# Patient Record
Sex: MALE | Race: OTHER | Employment: FULL TIME | URBAN - METROPOLITAN AREA
[De-identification: names, ages, dates, MRNs, and addresses within clinical notes are randomized per-mention and may not be internally consistent; named-entity substitution may affect disease eponyms.]

---

## 2017-01-07 ENCOUNTER — TELEPHONE (OUTPATIENT)
Dept: FAMILY MEDICINE CLINIC | Facility: CLINIC | Age: 47
End: 2017-01-07

## 2017-01-07 NOTE — TELEPHONE ENCOUNTER
Pt called in requesting to speak to Dr. Anne Hager or an RN about getting test strips for his diabetes. No further info was given.

## 2017-01-07 NOTE — TELEPHONE ENCOUNTER
With the assistance of  number [id number] patient states he lost all his accessories for his blood sugar monitor including the monitor. Supplies were originally ordered by Dr Dang Padgett at his old office.   Pt advised we do not have a record of which

## 2017-02-21 ENCOUNTER — OFFICE VISIT (OUTPATIENT)
Dept: FAMILY MEDICINE CLINIC | Facility: CLINIC | Age: 47
End: 2017-02-21

## 2017-02-21 VITALS
TEMPERATURE: 98 F | DIASTOLIC BLOOD PRESSURE: 68 MMHG | SYSTOLIC BLOOD PRESSURE: 129 MMHG | WEIGHT: 224 LBS | BODY MASS INDEX: 35 KG/M2 | HEART RATE: 78 BPM

## 2017-02-21 DIAGNOSIS — E11.9 TYPE 2 DIABETES MELLITUS WITHOUT COMPLICATION, WITHOUT LONG-TERM CURRENT USE OF INSULIN (HCC): Primary | ICD-10-CM

## 2017-02-21 PROCEDURE — 99214 OFFICE O/P EST MOD 30 MIN: CPT | Performed by: FAMILY MEDICINE

## 2017-02-21 PROCEDURE — 99212 OFFICE O/P EST SF 10 MIN: CPT | Performed by: FAMILY MEDICINE

## 2017-02-21 NOTE — PROGRESS NOTES
2/21/2017  4:38 PM    Candi Chávez is a 48year old male. Chief complaint(s): Patient presents with: Follow - Up: Patient here to f/u on his diabetes  Diabetes    HPI:     Roscoe Ness primary complaint is regarding DM.      Patient Se Date(s) Administered    >=3 YRS TRI  MULTIDOSE VIAL (21930) FLU CLINIC                          10/11/2016      TDAP                  08/01/2013      Medications (Active prior to today's visit):    Current Outpatient Prescriptions:  [employer] NEXT TEST Appropriate affect and demeanor.        LABORATORY RESULTS:   No results found for: Hiwot Rodrigues     Results for orders placed or performed during the hospital encounter of 12/21/16  -[facility] POCT RAPID STREP   Result Va HgbA1C level checked quarterly, daily foot self-inspection, need for yearly flu shots, and avoid all sodas, juices, candy, chocolates, cakes, ice cream, etc.      FOLLOW-UP: Schedule a follow-up visit in 4 months.        COUNSELING: The patient was counsele

## 2017-02-22 ENCOUNTER — TELEPHONE (OUTPATIENT)
Dept: FAMILY MEDICINE CLINIC | Facility: CLINIC | Age: 47
End: 2017-02-22

## 2017-07-18 ENCOUNTER — OFFICE VISIT (OUTPATIENT)
Dept: FAMILY MEDICINE CLINIC | Facility: CLINIC | Age: 47
End: 2017-07-18

## 2017-07-18 ENCOUNTER — LAB ENCOUNTER (OUTPATIENT)
Dept: LAB | Age: 47
End: 2017-07-18
Attending: FAMILY MEDICINE
Payer: COMMERCIAL

## 2017-07-18 VITALS
WEIGHT: 223 LBS | TEMPERATURE: 99 F | SYSTOLIC BLOOD PRESSURE: 127 MMHG | HEART RATE: 73 BPM | BODY MASS INDEX: 33.03 KG/M2 | DIASTOLIC BLOOD PRESSURE: 72 MMHG | HEIGHT: 69 IN

## 2017-07-18 DIAGNOSIS — E11.9 TYPE 2 DIABETES MELLITUS WITHOUT COMPLICATION, WITHOUT LONG-TERM CURRENT USE OF INSULIN (HCC): Primary | ICD-10-CM

## 2017-07-18 DIAGNOSIS — E11.9 TYPE 2 DIABETES MELLITUS WITHOUT COMPLICATION, WITHOUT LONG-TERM CURRENT USE OF INSULIN (HCC): ICD-10-CM

## 2017-07-18 LAB
ALBUMIN SERPL BCP-MCNC: 4.1 G/DL (ref 3.5–4.8)
ALBUMIN/GLOB SERPL: 1.7 {RATIO} (ref 1–2)
ALP SERPL-CCNC: 75 U/L (ref 32–100)
ALT SERPL-CCNC: 43 U/L (ref 17–63)
ANION GAP SERPL CALC-SCNC: 8 MMOL/L (ref 0–18)
AST SERPL-CCNC: 35 U/L (ref 15–41)
BILIRUB SERPL-MCNC: 0.4 MG/DL (ref 0.3–1.2)
BUN SERPL-MCNC: 11 MG/DL (ref 8–20)
BUN/CREAT SERPL: 15.7 (ref 10–20)
CALCIUM SERPL-MCNC: 8.9 MG/DL (ref 8.5–10.5)
CHLORIDE SERPL-SCNC: 106 MMOL/L (ref 95–110)
CO2 SERPL-SCNC: 23 MMOL/L (ref 22–32)
CREAT SERPL-MCNC: 0.7 MG/DL (ref 0.5–1.5)
CREAT UR-MCNC: 140.6 MG/DL
GLOBULIN PLAS-MCNC: 2.4 G/DL (ref 2.5–3.7)
GLUCOSE SERPL-MCNC: 176 MG/DL (ref 70–99)
MICROALBUMIN UR-MCNC: 0.3 MG/DL (ref 0–1.8)
MICROALBUMIN/CREAT UR: 2.1 MG/G{CREAT} (ref 0–20)
OSMOLALITY UR CALC.SUM OF ELEC: 288 MOSM/KG (ref 275–295)
POTASSIUM SERPL-SCNC: 3.7 MMOL/L (ref 3.3–5.1)
PROT SERPL-MCNC: 6.5 G/DL (ref 5.9–8.4)
SODIUM SERPL-SCNC: 137 MMOL/L (ref 136–144)

## 2017-07-18 PROCEDURE — 82570 ASSAY OF URINE CREATININE: CPT

## 2017-07-18 PROCEDURE — 83036 HEMOGLOBIN GLYCOSYLATED A1C: CPT

## 2017-07-18 PROCEDURE — 99214 OFFICE O/P EST MOD 30 MIN: CPT | Performed by: FAMILY MEDICINE

## 2017-07-18 PROCEDURE — 36415 COLL VENOUS BLD VENIPUNCTURE: CPT

## 2017-07-18 PROCEDURE — 82043 UR ALBUMIN QUANTITATIVE: CPT

## 2017-07-18 PROCEDURE — 99212 OFFICE O/P EST SF 10 MIN: CPT | Performed by: FAMILY MEDICINE

## 2017-07-18 PROCEDURE — 80053 COMPREHEN METABOLIC PANEL: CPT

## 2017-07-18 NOTE — PROGRESS NOTES
7/18/2017  3:26 PM    Iris Damon is a 48year old male. Chief complaint(s): Patient presents with:   Follow - Up: Patient here to f/u on his diabetes and requesting a referral to opthamologist.   Diabetes    HPI:     Chandler Newman History  Administered            Date(s) Administered    >=3 YRS TRI  MULTIDOSE VIAL (33700) FLU CLINIC                          10/11/2016      TDAP                  08/01/2013      Medications (Active prior to today's visit):    Current Outpatient Prescr icterus. Neck: Neck supple. Cardiovascular: Normal rate and regular rhythm. Pulmonary/Chest:   Clear to ascultation bilaterally. Abdominal: Soft. Normal appearance and bowel sounds are normal. There is no tenderness.    Feet: diabetic foot exam per tablet 3      Sig: Take 1 tablet (500 mg total) by mouth 2 (two) times daily. SAXagliptin HCl (ONGLYZA) 5 MG Oral Tab 90 tablet 2      Sig: Take 1 tablet by mouth daily. REFERRALS:       Ophthomology Dr Minal Morales.    RECOMMENDATIONS: instructed i

## 2017-07-19 LAB — HBA1C MFR BLD: 7.3 % (ref 4–6)

## 2017-07-19 NOTE — PROGRESS NOTES
Please call patient, results are within normal limits.   Diabetes well controlled continue present management keep present appointment 4 months from last visit

## 2017-07-20 ENCOUNTER — TELEPHONE (OUTPATIENT)
Dept: FAMILY MEDICINE CLINIC | Facility: CLINIC | Age: 47
End: 2017-07-20

## 2017-07-20 NOTE — TELEPHONE ENCOUNTER
Called patient. No answer. Left message on answering machine of normal results. Informed him his A1C has improved to continue present management and to keep his f/u appointment with Dr. Estela Shell in November. Also informed of normal CMP and microalbumin.

## 2017-07-20 NOTE — TELEPHONE ENCOUNTER
----- Message from Fortunato Serrano MD sent at 7/19/2017  3:51 PM CDT -----  Please call patient, results are within normal limits.   Diabetes well controlled continue present management keep present appointment 4 months from last visit

## 2017-09-22 ENCOUNTER — OFFICE VISIT (OUTPATIENT)
Dept: FAMILY MEDICINE CLINIC | Facility: CLINIC | Age: 47
End: 2017-09-22

## 2017-09-22 ENCOUNTER — NURSE TRIAGE (OUTPATIENT)
Dept: OTHER | Age: 47
End: 2017-09-22

## 2017-09-22 VITALS
HEART RATE: 83 BPM | WEIGHT: 225 LBS | SYSTOLIC BLOOD PRESSURE: 131 MMHG | HEIGHT: 69 IN | BODY MASS INDEX: 33.33 KG/M2 | DIASTOLIC BLOOD PRESSURE: 78 MMHG

## 2017-09-22 DIAGNOSIS — J30.2 ACUTE SEASONAL ALLERGIC RHINITIS, UNSPECIFIED TRIGGER: ICD-10-CM

## 2017-09-22 DIAGNOSIS — J45.21 MILD INTERMITTENT ASTHMA WITH ACUTE EXACERBATION: Primary | ICD-10-CM

## 2017-09-22 PROCEDURE — 99214 OFFICE O/P EST MOD 30 MIN: CPT | Performed by: NURSE PRACTITIONER

## 2017-09-22 RX ORDER — MONTELUKAST SODIUM 10 MG/1
10 TABLET ORAL DAILY
Qty: 90 TABLET | Refills: 3 | Status: SHIPPED | OUTPATIENT
Start: 2017-09-22 | End: 2018-06-25 | Stop reason: ALTCHOICE

## 2017-09-22 RX ORDER — FLUTICASONE PROPIONATE 50 MCG
2 SPRAY, SUSPENSION (ML) NASAL DAILY
Qty: 3 BOTTLE | Refills: 3 | Status: SHIPPED | OUTPATIENT
Start: 2017-09-22 | End: 2017-10-27

## 2017-09-22 RX ORDER — LEVOCETIRIZINE DIHYDROCHLORIDE 5 MG/1
5 TABLET, FILM COATED ORAL EVERY EVENING
Qty: 90 TABLET | Refills: 1 | Status: SHIPPED | OUTPATIENT
Start: 2017-09-22 | End: 2017-09-25

## 2017-09-22 NOTE — PROGRESS NOTES
Shortness Of Breath   This is a recurrent problem. The current episode started in the past 7 days. The problem has been gradually improving.  Pertinent negatives include no abdominal pain, chest pain, coryza, ear pain, fever, headaches, leg pain, leg swelli Social History Main Topics   Smoking status: Former Smoker     Types: Cigarettes    Quit date: 1/1/2002    Smokeless tobacco: Former User    Alcohol use No    Comment: non-drinker    Drug use: Unknown     Other Topics Concern   None on file     Social Abdominal: Soft. Bowel sounds are normal. He exhibits no distension and no mass. There is no tenderness. Musculoskeletal: Normal range of motion. Lymphadenopathy:     He has no cervical adenopathy.    Neurological: He is oriented to person, place, and

## 2017-09-22 NOTE — TELEPHONE ENCOUNTER
Action Requested: Summary for Provider     []  Critical Lab, Recommendations Needed  [] Need Additional Advice  [x]   FYI    []   Need Orders  [] Need Medications Sent to Pharmacy  []  Other     SUMMARY: Pt contacts clinic c/o cough x 5 days.   Described ep

## 2017-09-22 NOTE — PATIENT INSTRUCTIONS
Los desencadenantes del asma  Los desencadenantes son sustancias o condiciones que desencadenan chase síntomas de asma. Algunos desencadenantes pueden evitarse completamente, mientras que otros pueden prevenirse y es posible adaptarse a ellos.  Suzanne Núñez Las personas con asma suelen tener alergias. Si usted tiene alergias o Bhargav Farrellne, hable con blood proveedor de atención médica acerca de las pruebas y las opciones de tratamiento.  Las pruebas de alérgenos pueden determinar exactamente cuáles son La rinitis alérgica es roosevelt reacción alérgica que afecta la nariz y, con frecuencia, los ojos. Se la suele conocer thom alergias nasales. Es frecuente que las alergias nasales se deban a elementos que están presentes en el medioambiente y se respiran.  Según · Lave la ropa de cama todas las semanas con [place] y detergente o séquela en un ambiente caliente. · Cubra el colchón, el somier y las almohadas con fundas antialérgicas.   · Si es posible, duerma en un cuarto que no tenga tapete, nicholas ni muebl

## 2017-09-25 ENCOUNTER — TELEPHONE (OUTPATIENT)
Dept: OTHER | Age: 47
End: 2017-09-25

## 2017-09-25 ENCOUNTER — OFFICE VISIT (OUTPATIENT)
Dept: FAMILY MEDICINE CLINIC | Facility: CLINIC | Age: 47
End: 2017-09-25

## 2017-09-25 VITALS
WEIGHT: 223 LBS | DIASTOLIC BLOOD PRESSURE: 67 MMHG | BODY MASS INDEX: 33 KG/M2 | SYSTOLIC BLOOD PRESSURE: 122 MMHG | HEART RATE: 71 BPM | TEMPERATURE: 98 F

## 2017-09-25 DIAGNOSIS — E11.9 TYPE 2 DIABETES MELLITUS WITHOUT COMPLICATION, WITHOUT LONG-TERM CURRENT USE OF INSULIN (HCC): ICD-10-CM

## 2017-09-25 DIAGNOSIS — R42 DIZZINESS: Primary | ICD-10-CM

## 2017-09-25 LAB
GLUCOSE BLOOD: 264
TEST STRIP LOT #: NORMAL NUMERIC

## 2017-09-25 PROCEDURE — 82962 GLUCOSE BLOOD TEST: CPT | Performed by: FAMILY MEDICINE

## 2017-09-25 PROCEDURE — 99214 OFFICE O/P EST MOD 30 MIN: CPT | Performed by: FAMILY MEDICINE

## 2017-09-25 PROCEDURE — 36416 COLLJ CAPILLARY BLOOD SPEC: CPT | Performed by: FAMILY MEDICINE

## 2017-09-25 PROCEDURE — 99212 OFFICE O/P EST SF 10 MIN: CPT | Performed by: FAMILY MEDICINE

## 2017-09-25 RX ORDER — MECLIZINE HCL 12.5 MG/1
12.5 TABLET ORAL 3 TIMES DAILY PRN
Qty: 40 TABLET | Refills: 0 | Status: SHIPPED | OUTPATIENT
Start: 2017-09-25 | End: 2018-06-25 | Stop reason: ALTCHOICE

## 2017-09-25 NOTE — PROGRESS NOTES
9/25/2017  4:09 PM    Shiloh Pereira is a 48year old male. Chief complaint(s): Patient presents with:  Dizziness: X 4 days  Fatigue  Blurry Vision    HPI:     Shiloh Pereira primary complaint is regarding dizziness.      Shiloh Pereira Nasal Suspension 2 sprays by Each Nare route daily. Disp: 3 Bottle Rfl: 3   Montelukast Sodium (SINGULAIR) 10 MG Oral Tab Take 1 tablet (10 mg total) by mouth daily.  Disp: 90 tablet Rfl: 3   MetFORMIN HCl 500 MG Oral Tab Take 1 tablet (500 mg total) by lorenzo appearance and bowel sounds are normal. There is no tenderness. Skin: No rash noted. Psychiatric:   Appropriate affect and demeanor.        LABORATORY RESULTS:   No results found for: Hiwot Rodrigues     Results for o condition. Also, inform the doctor with any new symptoms or medications' side effects. Tight diabetes control. FOLLOW-UP: Schedule a follow-up visit in [address] [place] [address] /PRN.          Orders This Visit:    Orders Placed This Encounter      POC Finger stick glucose [

## 2017-09-25 NOTE — TELEPHONE ENCOUNTER
Using language line  Harish Vzáquez [id number]: Was seen on 9/22/17 by Marlen Alas. Patient stated he took both the Singulair and Levocetirizine this morning,    Patient stated is very dizzy and had to leave work.   He also has blurry visi

## 2017-10-04 ENCOUNTER — OFFICE VISIT (OUTPATIENT)
Dept: FAMILY MEDICINE CLINIC | Facility: CLINIC | Age: 47
End: 2017-10-04

## 2017-10-04 ENCOUNTER — NURSE TRIAGE (OUTPATIENT)
Dept: OTHER | Age: 47
End: 2017-10-04

## 2017-10-04 VITALS
TEMPERATURE: 98 F | WEIGHT: 223 LBS | BODY MASS INDEX: 33 KG/M2 | HEART RATE: 80 BPM | SYSTOLIC BLOOD PRESSURE: 132 MMHG | DIASTOLIC BLOOD PRESSURE: 76 MMHG

## 2017-10-04 DIAGNOSIS — N52.9 ERECTILE DYSFUNCTION, UNSPECIFIED ERECTILE DYSFUNCTION TYPE: ICD-10-CM

## 2017-10-04 DIAGNOSIS — N50.819 TESTICULAR PAIN: Primary | ICD-10-CM

## 2017-10-04 PROCEDURE — 99214 OFFICE O/P EST MOD 30 MIN: CPT | Performed by: FAMILY MEDICINE

## 2017-10-04 PROCEDURE — 81002 URINALYSIS NONAUTO W/O SCOPE: CPT | Performed by: FAMILY MEDICINE

## 2017-10-04 PROCEDURE — 99212 OFFICE O/P EST SF 10 MIN: CPT | Performed by: FAMILY MEDICINE

## 2017-10-04 RX ORDER — SILDENAFIL 100 MG/1
100 TABLET, FILM COATED ORAL AS NEEDED
Qty: 3 TABLET | Refills: 1 | Status: SHIPPED | OUTPATIENT
Start: 2017-10-04 | End: 2020-01-21

## 2017-10-04 NOTE — TELEPHONE ENCOUNTER
Action Requested: Summary for Provider     []  Critical Lab, Recommendations Needed  [] Need Additional Advice  [x]   FYI    []   Need Orders  [] Need Medications Sent to Pharmacy  [x]  Other     SUMMARY:OV appt made for today at 11:00 am.      Please note

## 2017-10-04 NOTE — PROGRESS NOTES
10/4/2017  11:55 AM    Maru Perla is a 48year old male.     Chief complaint(s): Patient presents with:  Pain: pt c/o pain in testicles X 2 days  Urinary: pt states had blood un urine this morning    HPI:     Maru Perla primary complaint Nasal Suspension 2 sprays by Each Nare route daily. Disp: 3 Bottle Rfl: 3   Montelukast Sodium (SINGULAIR) 10 MG Oral Tab Take 1 tablet (10 mg total) by mouth daily.  Disp: 90 tablet Rfl: 3   MetFORMIN HCl 500 MG Oral Tab Take 1 tablet (500 mg total) by lorenzo bilaterally. Abdominal: Soft. Normal appearance and bowel sounds are normal. There is no tenderness. Genitourinary: Testes normal and penis normal. Right testis shows no mass and no tenderness. Left testis shows no mass and no tenderness.  Uncircumcised or medications' side effects. FOLLOW-UP: Schedule a follow-up visit in 1-2 weeks.            Orders This Visit:    Orders Placed This Encounter      POC Urinalysis, Manual Dip without microscopy [[id number]]      Chlamydia/Gc Amplification [E]    Meds This

## 2017-10-05 ENCOUNTER — TELEPHONE (OUTPATIENT)
Dept: FAMILY MEDICINE CLINIC | Facility: CLINIC | Age: 47
End: 2017-10-05

## 2017-10-05 NOTE — TELEPHONE ENCOUNTER
----- Message from Fabiola Do MD sent at 10/5/2017  4:09 PM CDT -----  Please call patient, results are within normal limits.

## 2017-10-05 NOTE — TELEPHONE ENCOUNTER
Attempted to reach patient no answer, unable to leave message due to not having VM set up. If patient calls back please transfer to ext [phone].

## 2017-10-11 ENCOUNTER — OFFICE VISIT (OUTPATIENT)
Dept: FAMILY MEDICINE CLINIC | Facility: CLINIC | Age: 47
End: 2017-10-11

## 2017-10-11 VITALS
TEMPERATURE: 98 F | WEIGHT: 221 LBS | HEIGHT: 69 IN | DIASTOLIC BLOOD PRESSURE: 84 MMHG | SYSTOLIC BLOOD PRESSURE: 133 MMHG | BODY MASS INDEX: 32.73 KG/M2 | HEART RATE: 71 BPM

## 2017-10-11 DIAGNOSIS — R42 DIZZINESS: Primary | ICD-10-CM

## 2017-10-11 DIAGNOSIS — E11.9 TYPE 2 DIABETES MELLITUS WITHOUT COMPLICATION, WITHOUT LONG-TERM CURRENT USE OF INSULIN (HCC): ICD-10-CM

## 2017-10-11 PROCEDURE — 36416 COLLJ CAPILLARY BLOOD SPEC: CPT | Performed by: FAMILY MEDICINE

## 2017-10-11 PROCEDURE — 99212 OFFICE O/P EST SF 10 MIN: CPT | Performed by: FAMILY MEDICINE

## 2017-10-11 PROCEDURE — 82962 GLUCOSE BLOOD TEST: CPT | Performed by: FAMILY MEDICINE

## 2017-10-11 PROCEDURE — 99214 OFFICE O/P EST MOD 30 MIN: CPT | Performed by: FAMILY MEDICINE

## 2017-10-11 NOTE — PROGRESS NOTES
10/11/2017  1:07 PM    Tone Martinez is a 48year old male. Chief complaint(s): Patient presents with: Follow - Up: Patient here to f/u on his dizziness    HPI:     Tone Martinez primary complaint is regarding dizziness.      Melisa Buckley Problem Relation Age of Onset   • Diabetes Father      type 2   • Hypertension Mother       Social History: Smoking status: Former Smoker                                                              Packs/day: 0.00      Years: 0.00         Types: Cigaret shortness of breath and wheezing. Cardiovascular: Negative for chest pain and palpitations. Gastrointestinal: Negative for nausea, vomiting, abdominal pain, diarrhea and constipation. Endocrine: Negative for polydipsia and polyuria.    Tee Nicholas -CHLAMYDIA/GONOCOCCUS, ANN-MARIE   Result Value Ref Range   Chlamydia Trachomatis Amplified RNA Negative Negative   Neisseria Gonorrhoeae Amplified RNA Negative Negative   Chlam/GC Source Urine      EKG / Spirometry : -     Radiology: No results found.  -    AS Schedule a follow-up visit in  [address] [place] [address].            Orders This Visit:    Orders Placed This Encounter      POC Finger stick glucose [[id number]]    Meds This Visit:    No prescriptions requested or ordered in this encounter    Imaging & Referrals:  [facility]

## 2017-10-14 ENCOUNTER — HOSPITAL ENCOUNTER (OUTPATIENT)
Dept: ULTRASOUND IMAGING | Age: 47
Discharge: HOME | End: 2017-10-14
Attending: FAMILY MEDICINE
Payer: COMMERCIAL

## 2017-10-14 DIAGNOSIS — N50.819 TESTICULAR PAIN: ICD-10-CM

## 2017-10-14 PROCEDURE — 93975 VASCULAR STUDY: CPT | Performed by: FAMILY MEDICINE

## 2017-10-14 PROCEDURE — 76870 US EXAM SCROTUM: CPT | Performed by: FAMILY MEDICINE

## 2017-10-18 ENCOUNTER — OFFICE VISIT (OUTPATIENT)
Dept: FAMILY MEDICINE CLINIC | Facility: CLINIC | Age: 47
End: 2017-10-18

## 2017-10-18 VITALS
BODY MASS INDEX: 32.46 KG/M2 | HEART RATE: 62 BPM | TEMPERATURE: 98 F | DIASTOLIC BLOOD PRESSURE: 72 MMHG | HEIGHT: 69 IN | SYSTOLIC BLOOD PRESSURE: 120 MMHG | WEIGHT: 219.19 LBS

## 2017-10-18 DIAGNOSIS — R42 DIZZINESS: ICD-10-CM

## 2017-10-18 DIAGNOSIS — R51.9 ACUTE NONINTRACTABLE HEADACHE, UNSPECIFIED HEADACHE TYPE: Primary | ICD-10-CM

## 2017-10-18 DIAGNOSIS — R10.84 GENERALIZED ABDOMINAL PAIN: ICD-10-CM

## 2017-10-18 DIAGNOSIS — J01.11 ACUTE RECURRENT FRONTAL SINUSITIS: ICD-10-CM

## 2017-10-18 PROCEDURE — 99212 OFFICE O/P EST SF 10 MIN: CPT | Performed by: FAMILY MEDICINE

## 2017-10-18 PROCEDURE — 99214 OFFICE O/P EST MOD 30 MIN: CPT | Performed by: FAMILY MEDICINE

## 2017-10-18 RX ORDER — AMOXICILLIN 500 MG/1
500 CAPSULE ORAL 2 TIMES DAILY
Qty: 20 CAPSULE | Refills: 0 | Status: SHIPPED | OUTPATIENT
Start: 2017-10-18 | End: 2017-10-27

## 2017-10-18 RX ORDER — FLUTICASONE PROPIONATE 50 MCG
2 SPRAY, SUSPENSION (ML) NASAL DAILY
Qty: 1 INHALER | Refills: 3 | Status: SHIPPED | OUTPATIENT
Start: 2017-10-18 | End: 2018-06-25 | Stop reason: ALTCHOICE

## 2017-10-18 NOTE — PROGRESS NOTES
10/18/2017  10:16 AM    Mary Sher is a 48year old male. Chief complaint(s): Patient presents with:  Abdominal Pain: RLQ abdominal pain   Dizziness: patient states that he is still having dizzy spells that come and go.      HPI:     Francine Booker Administered    >=3 YRS TRI  MULTIDOSE VIAL (77560) FLU CLINIC                          10/11/2016      TDAP                  08/01/2013      Medications (Active prior to today's visit):    Current Outpatient Prescriptions:  amoxicillin 500 MG Oral Cap Kwame back pain and neck pain. Skin: Negative for rash. Neurological: Positive for dizziness and headaches. Negative for seizures. Psychiatric/Behavioral: Negative for depressed mood.        PHYSICAL EXAM:   Physical Exam    Constitutional: He appears well- capsule 0      Sig: Take 1 capsule (500 mg total) by mouth 2 (two) times daily. Fluticasone Propionate 50 MCG/ACT Nasal Suspension 1 Inhaler 3      Si sprays by Each Nare route daily.        REFERRALS: 1MRI BRAIN (W+WO) (CPT=70553),     MRI BRAIN (

## 2017-10-19 ENCOUNTER — TELEPHONE (OUTPATIENT)
Dept: FAMILY MEDICINE CLINIC | Facility: CLINIC | Age: 47
End: 2017-10-19

## 2017-10-20 NOTE — TELEPHONE ENCOUNTER
Note was approved by Dr. Susann Carrel. Phone call made no answer, note will be placed at [facility]  for p/u.

## 2017-10-27 ENCOUNTER — APPOINTMENT (OUTPATIENT)
Dept: LAB | Age: 47
End: 2017-10-27
Attending: FAMILY MEDICINE
Payer: COMMERCIAL

## 2017-10-27 ENCOUNTER — LAB ENCOUNTER (OUTPATIENT)
Dept: LAB | Age: 47
End: 2017-10-27
Attending: FAMILY MEDICINE
Payer: COMMERCIAL

## 2017-10-27 ENCOUNTER — OFFICE VISIT (OUTPATIENT)
Dept: FAMILY MEDICINE CLINIC | Facility: CLINIC | Age: 47
End: 2017-10-27

## 2017-10-27 ENCOUNTER — NURSE TRIAGE (OUTPATIENT)
Dept: OTHER | Age: 47
End: 2017-10-27

## 2017-10-27 VITALS
HEART RATE: 64 BPM | BODY MASS INDEX: 32 KG/M2 | SYSTOLIC BLOOD PRESSURE: 127 MMHG | WEIGHT: 218 LBS | TEMPERATURE: 98 F | DIASTOLIC BLOOD PRESSURE: 72 MMHG

## 2017-10-27 DIAGNOSIS — Z00.00 ROUTINE GENERAL MEDICAL EXAMINATION AT A HEALTH CARE FACILITY: Primary | ICD-10-CM

## 2017-10-27 DIAGNOSIS — R42 DIZZINESS: ICD-10-CM

## 2017-10-27 DIAGNOSIS — R09.81 NASAL CONGESTION: ICD-10-CM

## 2017-10-27 DIAGNOSIS — R42 DIZZINESS: Primary | ICD-10-CM

## 2017-10-27 PROCEDURE — 99212 OFFICE O/P EST SF 10 MIN: CPT | Performed by: FAMILY MEDICINE

## 2017-10-27 PROCEDURE — 99214 OFFICE O/P EST MOD 30 MIN: CPT | Performed by: FAMILY MEDICINE

## 2017-10-27 PROCEDURE — 36415 COLL VENOUS BLD VENIPUNCTURE: CPT

## 2017-10-27 PROCEDURE — 93005 ELECTROCARDIOGRAM TRACING: CPT

## 2017-10-27 PROCEDURE — 93010 ELECTROCARDIOGRAM REPORT: CPT | Performed by: FAMILY MEDICINE

## 2017-10-27 PROCEDURE — 84443 ASSAY THYROID STIM HORMONE: CPT

## 2017-10-27 RX ORDER — PREDNISONE 20 MG/1
20 TABLET ORAL 2 TIMES DAILY
Qty: 6 TABLET | Refills: 0 | Status: SHIPPED | OUTPATIENT
Start: 2017-10-27 | End: 2017-10-30

## 2017-10-27 RX ORDER — AZITHROMYCIN 250 MG/1
TABLET, FILM COATED ORAL
Qty: 6 TABLET | Refills: 0 | Status: SHIPPED | OUTPATIENT
Start: 2017-10-27 | End: 2018-06-25 | Stop reason: ALTCHOICE

## 2017-10-27 NOTE — TELEPHONE ENCOUNTER
Action Requested: Summary for Provider     []  Critical Lab, Recommendations Needed  [] Need Additional Advice  []   FYI    []   Need Orders  [] Need Medications Sent to Pharmacy  []  Other     SUMMARY: Appointment made with Dr Patric Moulton  Today 10/27/17 at

## 2017-10-27 NOTE — PROGRESS NOTES
Patient has had dizziness for 3 or 4 weeks. It is all day long but sometimes gets worse than other times. Sometimes he has had to miss work because of the dizziness.   He received meclizine which does not help him is received amoxicillin which has not hel

## 2017-10-30 ENCOUNTER — HOSPITAL ENCOUNTER (EMERGENCY)
Facility: HOSPITAL | Age: 47
Discharge: HOME | End: 2017-10-30
Attending: EMERGENCY MEDICINE
Payer: COMMERCIAL

## 2017-10-30 ENCOUNTER — APPOINTMENT (OUTPATIENT)
Dept: GENERAL RADIOLOGY | Facility: HOSPITAL | Age: 47
End: 2017-10-30
Attending: EMERGENCY MEDICINE
Payer: COMMERCIAL

## 2017-10-30 VITALS
BODY MASS INDEX: 35.36 KG/M2 | WEIGHT: 220 LBS | SYSTOLIC BLOOD PRESSURE: 131 MMHG | OXYGEN SATURATION: 95 % | RESPIRATION RATE: 21 BRPM | HEIGHT: 66 IN | TEMPERATURE: 98 F | DIASTOLIC BLOOD PRESSURE: 77 MMHG | HEART RATE: 75 BPM

## 2017-10-30 DIAGNOSIS — J40 BRONCHITIS: Primary | ICD-10-CM

## 2017-10-30 PROCEDURE — 71010 XR CHEST AP PORTABLE  (CPT=71010): CPT | Performed by: EMERGENCY MEDICINE

## 2017-10-30 PROCEDURE — 93005 ELECTROCARDIOGRAM TRACING: CPT

## 2017-10-30 PROCEDURE — 84484 ASSAY OF TROPONIN QUANT: CPT | Performed by: EMERGENCY MEDICINE

## 2017-10-30 PROCEDURE — 85379 FIBRIN DEGRADATION QUANT: CPT | Performed by: EMERGENCY MEDICINE

## 2017-10-30 PROCEDURE — 93010 ELECTROCARDIOGRAM REPORT: CPT | Performed by: EMERGENCY MEDICINE

## 2017-10-30 PROCEDURE — 85025 COMPLETE CBC W/AUTO DIFF WBC: CPT | Performed by: EMERGENCY MEDICINE

## 2017-10-30 PROCEDURE — 36415 COLL VENOUS BLD VENIPUNCTURE: CPT

## 2017-10-30 PROCEDURE — 99285 EMERGENCY DEPT VISIT HI MDM: CPT

## 2017-10-30 PROCEDURE — 80048 BASIC METABOLIC PNL TOTAL CA: CPT | Performed by: EMERGENCY MEDICINE

## 2017-10-30 RX ORDER — AZITHROMYCIN 250 MG/1
TABLET, FILM COATED ORAL
Qty: 1 PACKAGE | Refills: 0 | Status: SHIPPED | OUTPATIENT
Start: 2017-10-30 | End: 2017-11-04

## 2017-10-30 NOTE — ED NOTES
Patient reports right sided chest pain some upper abdominal pain and cough since yesterday. Denies fever. IV established to left hand #20 labs sent. Patient SR on monitor, 98% on room air.

## 2017-10-30 NOTE — ED PROVIDER NOTES
Patient Seen in: [facility] AND [facility] Emergency Department    History   Patient presents with:  Chest Pain Angina (cardiovascular)    Stated Complaint: numbness, chest pain    HPI    39-year-old male with history of diabetes and hyperlipidemia presents with Wt 99.8 kg   SpO2 95%   BMI 35.51 kg/m²         Physical Exam    General Appearance: alert, no distress  Eyes: pupils equal and round no pallor or injection  ENT, Mouth: mucous membranes moist  Respiratory: there are no retractions, lungs are clear to [employer] intervals and axes as noted on EKG Report.   Rate: 76  Rhythm: Sinus Rhythm  Axis: Normal  Reading: Normal EKG           ============================================================  ED Course  ------------------------------------------------------------  M

## 2017-11-06 ENCOUNTER — TELEPHONE (OUTPATIENT)
Dept: NEUROLOGY | Facility: CLINIC | Age: 47
End: 2017-11-06

## 2018-06-25 ENCOUNTER — OFFICE VISIT (OUTPATIENT)
Dept: FAMILY MEDICINE CLINIC | Facility: CLINIC | Age: 48
End: 2018-06-25

## 2018-06-25 VITALS
TEMPERATURE: 98 F | HEART RATE: 71 BPM | BODY MASS INDEX: 36 KG/M2 | DIASTOLIC BLOOD PRESSURE: 76 MMHG | SYSTOLIC BLOOD PRESSURE: 130 MMHG | WEIGHT: 225 LBS

## 2018-06-25 DIAGNOSIS — E11.9 TYPE 2 DIABETES MELLITUS WITHOUT COMPLICATION, WITHOUT LONG-TERM CURRENT USE OF INSULIN (HCC): Primary | ICD-10-CM

## 2018-06-25 PROCEDURE — 99212 OFFICE O/P EST SF 10 MIN: CPT | Performed by: FAMILY MEDICINE

## 2018-06-25 PROCEDURE — 99214 OFFICE O/P EST MOD 30 MIN: CPT | Performed by: FAMILY MEDICINE

## 2018-06-25 NOTE — PROGRESS NOTES
6/25/2018  4:34 PM    Nic Arellano is a 50year old male. Chief complaint(s): Patient presents with: Follow - Up: Patient here for diabetic check, however he did state that he has not taken his medication as prescribed.    Diabetes    HPI:     S Immunizations:     Immunization History  Administered            Date(s) Administered    >=3 YRS TRI  MULTIDOSE VIAL (00318) FLU CLINIC                          10/11/2016      TDAP                  08/01/2013      Medications (Active prior to today's HENT:   Head: Normocephalic. Right Ear: External ear normal.   Left Ear: External ear normal.   Nose: Nose normal. No rhinorrhea.    Mouth/Throat: Oropharynx is clear and moist and mucous membranes are normal.   Eyes: Conjunctivae are normal. No scleral 0.  Signed Prescriptions Disp Refills    SAXagliptin HCl (ONGLYZA) 5 MG Oral Tab 90 tablet 2      Sig: Take 1 tablet by mouth daily. MetFORMIN HCl 500 MG Oral Tab 180 tablet 3      Sig: Take 1 tablet (500 mg total) by mouth 2 (two) times daily.       E Rosalie Carroll MD

## 2018-07-30 ENCOUNTER — NURSE TRIAGE (OUTPATIENT)
Dept: FAMILY MEDICINE CLINIC | Facility: CLINIC | Age: 48
End: 2018-07-30

## 2018-07-30 ENCOUNTER — OFFICE VISIT (OUTPATIENT)
Dept: FAMILY MEDICINE CLINIC | Facility: CLINIC | Age: 48
End: 2018-07-30
Payer: COMMERCIAL

## 2018-07-30 VITALS
BODY MASS INDEX: 34.53 KG/M2 | WEIGHT: 220 LBS | DIASTOLIC BLOOD PRESSURE: 78 MMHG | HEART RATE: 71 BPM | SYSTOLIC BLOOD PRESSURE: 119 MMHG | HEIGHT: 67 IN

## 2018-07-30 DIAGNOSIS — L73.9 FOLLICULITIS: Primary | ICD-10-CM

## 2018-07-30 PROCEDURE — 99214 OFFICE O/P EST MOD 30 MIN: CPT | Performed by: FAMILY MEDICINE

## 2018-07-30 PROCEDURE — 99212 OFFICE O/P EST SF 10 MIN: CPT | Performed by: FAMILY MEDICINE

## 2018-07-30 RX ORDER — DIAPER,BRIEF,INFANT-TODD,DISP
EACH MISCELLANEOUS
Qty: 30 G | Refills: 1 | Status: SHIPPED | OUTPATIENT
Start: 2018-07-30 | End: 2020-01-21

## 2018-07-30 RX ORDER — DOXYCYCLINE 100 MG/1
100 TABLET ORAL 2 TIMES DAILY
Qty: 20 TABLET | Refills: 0 | Status: SHIPPED | OUTPATIENT
Start: 2018-07-30 | End: 2018-11-12

## 2018-07-30 NOTE — TELEPHONE ENCOUNTER
Action Requested: Summary for Provider     []  Critical Lab, Recommendations Needed  [x] Need Additional Advice  []   FYI    []   Need Orders  [] Need Medications Sent to Pharmacy  [x]  Other     SUMMARY: Pt asking if can be added with RM today; otherwis

## 2018-07-30 NOTE — TELEPHONE ENCOUNTER
Received response from  that pt can be added today. Pt informed and states will arrive in 5 minutes; added accordingly.

## 2018-07-30 NOTE — PROGRESS NOTES
7/30/2018  6:05 PM    Kady Hollingsworth is a 50year old male.     Chief complaint(s): Patient presents with:  Rash: rt leg ,very itchy x one week ,possible insect bite ,patient states it looked like a worm     HPI:     Kady Hollingsworth primary compl mouth daily. Disp: 90 tablet Rfl: 2   MetFORMIN HCl 500 MG Oral Tab Take 1 tablet (500 mg total) by mouth 2 (two) times daily.  Disp: 180 tablet Rfl: 3   Sildenafil Citrate (VIAGRA) 100 MG Oral Tab Take 1 tablet (100 mg total) by mouth as needed for [employer] (primary encounter diagnosis)  Diabetes    MEDICATIONS:     Signed Prescriptions Disp Refills    hydrocortisone 1 % External Cream 30 g 1      Sig: Apply to affected area(s) BID      Doxycycline Monohydrate 100 MG Oral Tab 20 tablet 0      Sig: Take 100 mg

## 2018-09-18 ENCOUNTER — OFFICE VISIT (OUTPATIENT)
Dept: FAMILY MEDICINE CLINIC | Facility: CLINIC | Age: 48
End: 2018-09-18
Payer: COMMERCIAL

## 2018-09-18 ENCOUNTER — APPOINTMENT (OUTPATIENT)
Dept: LAB | Age: 48
End: 2018-09-18
Attending: FAMILY MEDICINE
Payer: COMMERCIAL

## 2018-09-18 VITALS
DIASTOLIC BLOOD PRESSURE: 81 MMHG | WEIGHT: 225 LBS | BODY MASS INDEX: 35 KG/M2 | SYSTOLIC BLOOD PRESSURE: 133 MMHG | HEART RATE: 76 BPM

## 2018-09-18 DIAGNOSIS — IMO0001 UNCONTROLLED TYPE 2 DIABETES MELLITUS WITHOUT COMPLICATION, WITHOUT LONG-TERM CURRENT USE OF INSULIN: Primary | ICD-10-CM

## 2018-09-18 DIAGNOSIS — IMO0001 UNCONTROLLED TYPE 2 DIABETES MELLITUS WITHOUT COMPLICATION, WITHOUT LONG-TERM CURRENT USE OF INSULIN: ICD-10-CM

## 2018-09-18 LAB
ALBUMIN SERPL BCP-MCNC: 4 G/DL (ref 3.5–4.8)
ALBUMIN/GLOB SERPL: 1.5 {RATIO} (ref 1–2)
ALP SERPL-CCNC: 91 U/L (ref 32–100)
ALT SERPL-CCNC: 85 U/L (ref 17–63)
ANION GAP SERPL CALC-SCNC: 7 MMOL/L (ref 0–18)
AST SERPL-CCNC: 63 U/L (ref 15–41)
BILIRUB SERPL-MCNC: 0.6 MG/DL (ref 0.3–1.2)
BUN SERPL-MCNC: 8 MG/DL (ref 8–20)
BUN/CREAT SERPL: 10.5 (ref 10–20)
CALCIUM SERPL-MCNC: 9.1 MG/DL (ref 8.5–10.5)
CHLORIDE SERPL-SCNC: 103 MMOL/L (ref 95–110)
CO2 SERPL-SCNC: 23 MMOL/L (ref 22–32)
CREAT SERPL-MCNC: 0.76 MG/DL (ref 0.5–1.5)
CREAT UR-MCNC: 48.1 MG/DL
GLOBULIN PLAS-MCNC: 2.6 G/DL (ref 2.5–3.7)
GLUCOSE SERPL-MCNC: 338 MG/DL (ref 70–99)
HBA1C MFR BLD: 11.4 % (ref 4–6)
MICROALBUMIN UR-MCNC: 0 MG/DL (ref 0–1.8)
MICROALBUMIN/CREAT UR: 0 MG/G{CREAT} (ref 0–20)
OSMOLALITY UR CALC.SUM OF ELEC: 288 MOSM/KG (ref 275–295)
PATIENT FASTING: NO
POTASSIUM SERPL-SCNC: 3.8 MMOL/L (ref 3.3–5.1)
PROT SERPL-MCNC: 6.6 G/DL (ref 5.9–8.4)
SODIUM SERPL-SCNC: 133 MMOL/L (ref 136–144)

## 2018-09-18 PROCEDURE — 99214 OFFICE O/P EST MOD 30 MIN: CPT | Performed by: FAMILY MEDICINE

## 2018-09-18 PROCEDURE — 80053 COMPREHEN METABOLIC PANEL: CPT

## 2018-09-18 PROCEDURE — 36415 COLL VENOUS BLD VENIPUNCTURE: CPT

## 2018-09-18 PROCEDURE — 83036 HEMOGLOBIN GLYCOSYLATED A1C: CPT

## 2018-09-18 PROCEDURE — 82043 UR ALBUMIN QUANTITATIVE: CPT

## 2018-09-18 PROCEDURE — 99212 OFFICE O/P EST SF 10 MIN: CPT | Performed by: FAMILY MEDICINE

## 2018-09-18 PROCEDURE — 82570 ASSAY OF URINE CREATININE: CPT

## 2018-09-26 ENCOUNTER — APPOINTMENT (OUTPATIENT)
Dept: ENDOCRINOLOGY | Facility: HOSPITAL | Age: 48
End: 2018-09-26
Attending: FAMILY MEDICINE
Payer: COMMERCIAL

## 2018-10-10 ENCOUNTER — APPOINTMENT (OUTPATIENT)
Dept: ENDOCRINOLOGY | Facility: HOSPITAL | Age: 48
End: 2018-10-10
Attending: FAMILY MEDICINE
Payer: COMMERCIAL

## 2018-10-12 ENCOUNTER — TELEPHONE (OUTPATIENT)
Dept: ENDOCRINOLOGY | Facility: HOSPITAL | Age: 48
End: 2018-10-12

## 2018-11-01 ENCOUNTER — TELEPHONE (OUTPATIENT)
Dept: FAMILY MEDICINE CLINIC | Facility: CLINIC | Age: 48
End: 2018-11-01

## 2018-11-01 NOTE — TELEPHONE ENCOUNTER
Consult from Dr Tyree Umana received, consult reviewed, information entered in flow sheet and copy sent to scanning.

## 2018-11-12 ENCOUNTER — OFFICE VISIT (OUTPATIENT)
Dept: FAMILY MEDICINE CLINIC | Facility: CLINIC | Age: 48
End: 2018-11-12
Payer: COMMERCIAL

## 2018-11-12 VITALS
DIASTOLIC BLOOD PRESSURE: 74 MMHG | BODY MASS INDEX: 35 KG/M2 | TEMPERATURE: 98 F | SYSTOLIC BLOOD PRESSURE: 125 MMHG | WEIGHT: 223.63 LBS | HEART RATE: 72 BPM

## 2018-11-12 DIAGNOSIS — IMO0001 UNCONTROLLED TYPE 2 DIABETES MELLITUS WITHOUT COMPLICATION, WITHOUT LONG-TERM CURRENT USE OF INSULIN: Primary | ICD-10-CM

## 2018-11-12 PROCEDURE — 36416 COLLJ CAPILLARY BLOOD SPEC: CPT | Performed by: FAMILY MEDICINE

## 2018-11-12 PROCEDURE — 99212 OFFICE O/P EST SF 10 MIN: CPT | Performed by: FAMILY MEDICINE

## 2018-11-12 PROCEDURE — 82962 GLUCOSE BLOOD TEST: CPT | Performed by: FAMILY MEDICINE

## 2018-11-12 PROCEDURE — 99214 OFFICE O/P EST MOD 30 MIN: CPT | Performed by: FAMILY MEDICINE

## 2018-11-12 RX ORDER — PAROXETINE 10 MG/1
10 TABLET, FILM COATED ORAL
COMMUNITY
Start: 2016-02-25 | End: 2021-03-19 | Stop reason: ALTCHOICE

## 2018-11-12 RX ORDER — GLIMEPIRIDE 2 MG/1
2 TABLET ORAL 2 TIMES DAILY
Qty: 30 TABLET | Refills: 3 | Status: SHIPPED | OUTPATIENT
Start: 2018-11-12 | End: 2019-11-07

## 2018-11-13 NOTE — PROGRESS NOTES
11/12/2018  6:23 PM    Jayme Jones is a 50year old male. Chief complaint(s): Patient presents with: Follow - Up: Patient here to f/u on his diabetes, has not taken medication since his insurance will not cover due to his deductible.    Diabete tobacco: Never Used    Alcohol use: No      Alcohol/week: 0.0 oz      Comment: non-drinker    Drug use: No       Immunizations:     Immunization History  Administered            Date(s) Administered    >=3 YRS TRI  MULTIDOSE VIAL (64390) FLU CLINIC BMI 35.02 kg/m²    HENT:   Head: Normocephalic. Right Ear: External ear normal.   Left Ear: External ear normal.   Nose: Nose normal. No rhinorrhea.    Mouth/Throat: Oropharynx is clear and moist and mucous membranes are normal.   Eyes: Conjunctivae are Prescriptions Disp Refills   • insulin glargine (BASAGLAR KWIKPEN) 100 UNIT/ML Subcutaneous Solution Pen-injector 6 pen 1     Sig: Inject 12 Units into the skin nightly.    • glimepiride 2 MG Oral Tab 30 tablet 3     Sig: Take 1 tablet (2 mg total) by mouth discussion.       Cindy Patel MD

## 2018-11-29 ENCOUNTER — APPOINTMENT (OUTPATIENT)
Dept: ENDOCRINOLOGY | Facility: HOSPITAL | Age: 48
End: 2018-11-29
Attending: FAMILY MEDICINE
Payer: COMMERCIAL

## 2018-12-11 ENCOUNTER — HOSPITAL ENCOUNTER (OUTPATIENT)
Dept: ENDOCRINOLOGY | Facility: HOSPITAL | Age: 48
Discharge: HOME | End: 2018-12-11
Attending: FAMILY MEDICINE
Payer: COMMERCIAL

## 2018-12-11 DIAGNOSIS — E11.65 UNCONTROLLED TYPE 2 DIABETES MELLITUS WITH HYPERGLYCEMIA (HCC): Primary | ICD-10-CM

## 2018-12-11 NOTE — PROGRESS NOTES
Faustino Heard  : 1970 was seen for Diabetic Medical Nutrition Therapy:    Date: 2018  Start time: 56 End time: 80  Visited with use of . Extremely drowsy at time of visit.      Assessment: There were no vitals taken for reading. Discussed macronutrient balance: reduce starch, include lean protein and non-starchy vegetables, also fruit, yogurt and milk using food models and carbohydrate content lists.     Reviewed principles for heart healthy diet: reduced saturated and

## 2019-04-09 ENCOUNTER — NURSE TRIAGE (OUTPATIENT)
Dept: OTHER | Age: 49
End: 2019-04-09

## 2019-04-09 ENCOUNTER — OFFICE VISIT (OUTPATIENT)
Dept: FAMILY MEDICINE CLINIC | Facility: CLINIC | Age: 49
End: 2019-04-09
Payer: COMMERCIAL

## 2019-04-09 VITALS
BODY MASS INDEX: 35.79 KG/M2 | SYSTOLIC BLOOD PRESSURE: 136 MMHG | HEART RATE: 64 BPM | WEIGHT: 228 LBS | HEIGHT: 67 IN | DIASTOLIC BLOOD PRESSURE: 84 MMHG

## 2019-04-09 DIAGNOSIS — E11.65 TYPE 2 DIABETES MELLITUS WITH HYPERGLYCEMIA, WITH LONG-TERM CURRENT USE OF INSULIN (HCC): Primary | ICD-10-CM

## 2019-04-09 DIAGNOSIS — I83.93 ASYMPTOMATIC VARICOSE VEINS OF BOTH LOWER EXTREMITIES: ICD-10-CM

## 2019-04-09 DIAGNOSIS — R03.0 ELEVATED BLOOD PRESSURE READING: ICD-10-CM

## 2019-04-09 DIAGNOSIS — B35.1 ONYCHOMYCOSIS: ICD-10-CM

## 2019-04-09 DIAGNOSIS — T14.8XXA BLOOD BLISTER: ICD-10-CM

## 2019-04-09 DIAGNOSIS — Z79.4 TYPE 2 DIABETES MELLITUS WITH HYPERGLYCEMIA, WITH LONG-TERM CURRENT USE OF INSULIN (HCC): Primary | ICD-10-CM

## 2019-04-09 DIAGNOSIS — Z12.5 SCREENING FOR MALIGNANT NEOPLASM OF PROSTATE: ICD-10-CM

## 2019-04-09 DIAGNOSIS — Z00.00 WELL ADULT EXAM: ICD-10-CM

## 2019-04-09 PROBLEM — F32.A DEPRESSION (EMOTION): Status: ACTIVE | Noted: 2019-04-09

## 2019-04-09 PROBLEM — N52.1 ERECTILE DYSFUNCTION DUE TO DISEASES CLASSIFIED ELSEWHERE: Status: ACTIVE | Noted: 2019-04-09

## 2019-04-09 PROCEDURE — 99214 OFFICE O/P EST MOD 30 MIN: CPT | Performed by: NURSE PRACTITIONER

## 2019-04-09 RX ORDER — LISINOPRIL 10 MG/1
10 TABLET ORAL DAILY
Qty: 90 TABLET | Refills: 3 | Status: SHIPPED | OUTPATIENT
Start: 2019-04-09 | End: 2020-04-03

## 2019-04-09 NOTE — PROGRESS NOTES
HPI  Pt here for black spot on underside of left 2nd toe. Has thickened toenails on bilat feet. Dusky color to right foot toes.      bs this am 128; 185 after lunch (now)    Review of Systems   Constitutional: Negative for activity change and appetite pamela Substance and Sexual Activity      Alcohol use: No        Alcohol/week: 0.0 oz        Comment: non-drinker      Drug use: No      Sexual activity: Not on file    Lifestyle      Physical activity:        Days per week: Not on file        Minutes per sessi Dorsalis pedis pulses are 2+ on the right side, and 2+ on the left side. Posterior tibial pulses are 1+ on the right side, and 1+ on the left side. Pulmonary/Chest: Effort normal and breath sounds normal. No respiratory distress.    Musculoske

## 2019-04-09 NOTE — ASSESSMENT & PLAN NOTE
Diabetic labs ordered-last done 9/2018  Add lisinopril 10 mg I po q am  Pt is not on statin-lipid panel ordered  refer podiatry

## 2019-04-09 NOTE — TELEPHONE ENCOUNTER
Action Requested: Summary for Provider     []  Critical Lab, Recommendations Needed  [] Need Additional Advice  [x]   FYI    []   Need Orders  [] Need Medications Sent to Pharmacy  []  Other     SUMMARY: Patient calling with complaint of black spot on left

## 2019-09-21 NOTE — TELEPHONE ENCOUNTER
PA for Onglyza 5 mg tab completed with [employer] via CMM response time 3-5 business days KEY VQ. yesterday

## 2019-10-10 RX ORDER — SAXAGLIPTIN 5 MG/1
TABLET, FILM COATED ORAL
Qty: 90 TABLET | Refills: 0 | OUTPATIENT
Start: 2019-10-10

## 2019-10-10 RX ORDER — EMPAGLIFLOZIN 25 MG/1
TABLET, FILM COATED ORAL
Qty: 90 TABLET | Refills: 0 | OUTPATIENT
Start: 2019-10-10

## 2019-10-10 NOTE — TELEPHONE ENCOUNTER
Please review; protocol failed. Onglyza and metformin listed as d/c'd    Pt overdue for labs and OV    CSS please contact pt to schedule and remind him to complete labs            Recent Visits  Date Type Provider Dept   04/09/19 Office Visit REBECA Venegas, REBECCA [facility]   11/12/18 Office Visit Elías Linda MD [facility]   09/18/18 Office Visit Elías Linda MD [facility]   07/30/18 Office Visit Elías Linda MD [facility]   06/25/18 Office Visit Elías Linda MD [facility]   05/01/18 Appointment Elías Linda MD [facility]   Showing recent visits within past 540 days with a meds authorizing provider and meeting all other requirements     Future Appointments  No visits were found meeting these conditions.    Showing future appointments within next 150 days with a meds authorizing provider and meeting all other requirements

## 2019-10-11 ENCOUNTER — TELEPHONE (OUTPATIENT)
Dept: FAMILY MEDICINE CLINIC | Facility: CLINIC | Age: 49
End: 2019-10-11

## 2019-10-14 DIAGNOSIS — IMO0001 UNCONTROLLED TYPE 2 DIABETES MELLITUS WITHOUT COMPLICATION, WITHOUT LONG-TERM CURRENT USE OF INSULIN: ICD-10-CM

## 2020-01-21 ENCOUNTER — TELEPHONE (OUTPATIENT)
Dept: FAMILY MEDICINE CLINIC | Facility: CLINIC | Age: 50
End: 2020-01-21

## 2020-01-21 ENCOUNTER — OFFICE VISIT (OUTPATIENT)
Dept: FAMILY MEDICINE CLINIC | Facility: CLINIC | Age: 50
End: 2020-01-21
Payer: COMMERCIAL

## 2020-01-21 VITALS
DIASTOLIC BLOOD PRESSURE: 72 MMHG | BODY MASS INDEX: 33.9 KG/M2 | HEART RATE: 65 BPM | SYSTOLIC BLOOD PRESSURE: 115 MMHG | WEIGHT: 216 LBS | TEMPERATURE: 98 F | HEIGHT: 67 IN

## 2020-01-21 DIAGNOSIS — E11.65 UNCONTROLLED TYPE 2 DIABETES MELLITUS WITH HYPERGLYCEMIA (HCC): Primary | ICD-10-CM

## 2020-01-21 LAB
CARTRIDGE LOT#: 564 NUMERIC
HEMOGLOBIN A1C: 11.1 % (ref 4.3–5.6)

## 2020-01-21 PROCEDURE — 36416 COLLJ CAPILLARY BLOOD SPEC: CPT | Performed by: FAMILY MEDICINE

## 2020-01-21 PROCEDURE — 83036 HEMOGLOBIN GLYCOSYLATED A1C: CPT | Performed by: FAMILY MEDICINE

## 2020-01-21 PROCEDURE — 99214 OFFICE O/P EST MOD 30 MIN: CPT | Performed by: FAMILY MEDICINE

## 2020-01-21 RX ORDER — GLIMEPIRIDE 2 MG/1
2 TABLET ORAL
Qty: 90 TABLET | Refills: 2 | Status: SHIPPED | OUTPATIENT
Start: 2020-01-21 | End: 2020-06-18

## 2020-01-21 NOTE — PROGRESS NOTES
1/21/2020  10:55 AM    Jolie Vidal is a 44year old male. Chief complaint(s): Patient presents with:  Breathing Problem: x 2 wks  Uncontrol diabetes  HPI:     Jolie Vidal primary complaint is regarding as above.      Patient Sofi Herring 0.0 standard drinks      Comment: non-drinker    Drug use: No       Immunizations:     Immunization History  Administered            Date(s) Administered    >=3 YRS TRI  MULTIDOSE VIAL (02430) FLU CLINIC                          10/11/2016      TDAP 65   Temp 97.6 °F (36.4 °C)   Ht 5' 7\" (1.702 m)   Wt 216 lb (98 kg)   BMI 33.83 kg/m²    HENT:   Head: Normocephalic. Eyes: Conjunctivae are normal. No scleral icterus. Neck: Neck supple. Cardiovascular: Normal rate and regular rhythm.     Pulmonary PARoxetine HCl 10 MG Oral Tab, Take 10 mg by mouth., Disp: , Rfl:   •  RICKY CONTOUR NEXT TEST In Vitro Strip, , Disp: , Rfl: 0.   Requested Prescriptions     Signed Prescriptions Disp Refills   • insulin glargine (LANTUS SOLOSTAR) 100 UNIT/ML Subcutaneous discussed.          Orders This Visit:  Orders Placed This Encounter      POC Glycohemoglobin [[id number]]      Meds This Visit:  Requested Prescriptions     Signed Prescriptions Disp Refills   • insulin glargine (LANTUS SOLOSTAR) 100 UNIT/ML Subcutaneous Soluti

## 2020-01-21 NOTE — TELEPHONE ENCOUNTER
Spoke with pharmacist Michael--he reviewed Ozempic directions, patient can dial pen for dose--reports Rx was approved via patient's insurance for $25. No further questions/concerns at this time.

## 2020-01-21 NOTE — TELEPHONE ENCOUNTER
Michael girard from [email or web address] and states he need clarifications on new prescription on medication      Ozempic    Please Advise   [phone]

## 2020-01-29 ENCOUNTER — TELEPHONE (OUTPATIENT)
Dept: FAMILY MEDICINE CLINIC | Facility: CLINIC | Age: 50
End: 2020-01-29

## 2020-01-29 NOTE — TELEPHONE ENCOUNTER
With language line  ID# [id number]. Patient needing to schedule an appointment with Dr. Oliver Sender regarding on how to check his blood sugar. Patient requesting an appointment 5:30 pm or later, or this Saturday at 12:45.   Can you fit this patien

## 2020-02-11 ENCOUNTER — OFFICE VISIT (OUTPATIENT)
Dept: FAMILY MEDICINE CLINIC | Facility: CLINIC | Age: 50
End: 2020-02-11
Payer: COMMERCIAL

## 2020-02-11 ENCOUNTER — TELEPHONE (OUTPATIENT)
Dept: FAMILY MEDICINE CLINIC | Facility: CLINIC | Age: 50
End: 2020-02-11

## 2020-02-11 VITALS
TEMPERATURE: 98 F | HEART RATE: 67 BPM | DIASTOLIC BLOOD PRESSURE: 85 MMHG | SYSTOLIC BLOOD PRESSURE: 136 MMHG | BODY MASS INDEX: 35.5 KG/M2 | HEIGHT: 67 IN | WEIGHT: 226.19 LBS

## 2020-02-11 DIAGNOSIS — E11.65 UNCONTROLLED TYPE 2 DIABETES MELLITUS WITH HYPERGLYCEMIA (HCC): Primary | ICD-10-CM

## 2020-02-11 PROCEDURE — 99213 OFFICE O/P EST LOW 20 MIN: CPT | Performed by: FAMILY MEDICINE

## 2020-02-11 NOTE — PROGRESS NOTES
2/11/2020  1:19 PM    Tu Hinson is a 44year old male. Chief complaint(s): Patient presents with:  Diabetes: insulin teaching    HPI:     Tu Hinson primary complaint is regarding diabetes.                                           P Alcohol/week: 0.0 standard drinks      Comment: non-drinker    Drug use: No       Immunizations:     Immunization History  Administered            Date(s) Administered    >=3 YRS TRI  MULTIDOSE VIAL (39202) FLU CLINIC                          10/11/2016 well-developed and well-nourished.    /85 (BP Location: Right arm, Patient Position: Sitting, Cuff Size: large)   Pulse 67   Temp 97.9 °F (36.6 °C) (Oral)   Ht 5' 7\" (1.702 m)   Wt 226 lb 3.2 oz (102.6 kg)   BMI 35.43 kg/m²    HENT:   Head: Normoceph subcutaneously once every 7 days (weekly). Administer the dose at any time of day, with or without meals. After 4 weeks increase the dose to 0.5 mg subcutaneously once weekly. (Patient not taking: Reported on 2/11/2020 ), Disp: 4 pen, Rfl: 1.   Requested Pr

## 2020-02-12 NOTE — TELEPHONE ENCOUNTER
Returned call for page received in regard to vomiting and dizziness. Attempted to return call x 4. Receiving voicemail. Left a voicemail asking patient to page again in order to make another attempt to return call since it appears that the phone is off.

## 2020-02-12 NOTE — TELEPHONE ENCOUNTER
Page received again in regard to patient having dizziness with vomiting and reports of a bad reaction to using insulin for the first time. Patient reports using 28 units of lantus for the first time and states that he has never used this kind of insulin.

## 2020-02-13 NOTE — TELEPHONE ENCOUNTER
transferred call to triage since could not get a hold of anyone at the [address] [place] [address] office. With Albanian interpretor, patient stated that still having the dizziness and nausea but not as bad as it was the first day. Still has symptoms today.  No

## 2020-02-27 ENCOUNTER — TELEPHONE (OUTPATIENT)
Dept: FAMILY MEDICINE CLINIC | Facility: CLINIC | Age: 50
End: 2020-02-27

## 2020-02-27 NOTE — TELEPHONE ENCOUNTER
Daughter with pt calling asking what a normal blood sugar level is for someone who does not have diabetes.     Pt checked glucose today and had reading 110, pt denies any problems and is taking medication and using insulin, and following ADA diet as was ins

## 2020-06-18 ENCOUNTER — NURSE TRIAGE (OUTPATIENT)
Dept: FAMILY MEDICINE CLINIC | Facility: CLINIC | Age: 50
End: 2020-06-18

## 2020-06-18 ENCOUNTER — VIRTUAL PHONE E/M (OUTPATIENT)
Dept: FAMILY MEDICINE CLINIC | Facility: CLINIC | Age: 50
End: 2020-06-18

## 2020-06-18 DIAGNOSIS — R21 RASH: Primary | ICD-10-CM

## 2020-06-18 DIAGNOSIS — E11.65 UNCONTROLLED TYPE 2 DIABETES MELLITUS WITH HYPERGLYCEMIA (HCC): ICD-10-CM

## 2020-06-18 PROCEDURE — 99214 OFFICE O/P EST MOD 30 MIN: CPT | Performed by: FAMILY MEDICINE

## 2020-06-18 RX ORDER — GLIMEPIRIDE 2 MG/1
2 TABLET ORAL
Qty: 90 TABLET | Refills: 0 | Status: SHIPPED | OUTPATIENT
Start: 2020-06-18 | End: 2020-07-18

## 2020-06-18 NOTE — PROGRESS NOTES
Virtual Telephone Check-In    Cali Marks verbally consents to a Virtual/Telephone Check-In visit on 06/18/20. Patient has been referred to the [facility] website at [email or web address] to review the yearly Consent to Treat document.     Patient un [E]    Additional orders include:   MEDICATIONS:  •  Semaglutide,0.25 or 0.5MG/DOS, (OZEMPIC, 0.25 OR 0.5 MG/DOSE,) 2 MG/1.5ML Subcutaneous Solution Pen-injector, Inject 0.25 mg into the skin once a week.  Initially, 0.25 mg subcutaneously once every 7 days injections, adherence to an 1800 calorie ADA diet,  5 pound weight loss, a graduated exercise program, HgbA1C level checked quarterly, daily foot self-inspection, need for yearly flu shots, and avoid all sodas, juices, candy, chocolates, cakes, ice cream,

## 2020-06-18 NOTE — TELEPHONE ENCOUNTER
Using language line   [id number]:    Discussed with patient that provider may decide to do a \"telephone visit\" or a video visit if MyChart active. Patient advised that there may be a co-pay involved in this type of visit.      Patient agre

## 2020-06-22 ENCOUNTER — APPOINTMENT (OUTPATIENT)
Dept: LAB | Age: 50
End: 2020-06-22
Attending: FAMILY MEDICINE
Payer: COMMERCIAL

## 2020-06-22 DIAGNOSIS — E11.65 UNCONTROLLED TYPE 2 DIABETES MELLITUS WITH HYPERGLYCEMIA (HCC): ICD-10-CM

## 2020-06-22 PROCEDURE — 36415 COLL VENOUS BLD VENIPUNCTURE: CPT

## 2020-06-22 PROCEDURE — 83036 HEMOGLOBIN GLYCOSYLATED A1C: CPT

## 2020-06-22 PROCEDURE — 80053 COMPREHEN METABOLIC PANEL: CPT

## 2020-06-22 PROCEDURE — 82043 UR ALBUMIN QUANTITATIVE: CPT

## 2020-06-22 PROCEDURE — 82570 ASSAY OF URINE CREATININE: CPT

## 2020-06-24 ENCOUNTER — OFFICE VISIT (OUTPATIENT)
Dept: FAMILY MEDICINE CLINIC | Facility: CLINIC | Age: 50
End: 2020-06-24
Payer: COMMERCIAL

## 2020-06-24 VITALS
HEIGHT: 67 IN | DIASTOLIC BLOOD PRESSURE: 74 MMHG | WEIGHT: 204 LBS | BODY MASS INDEX: 32.02 KG/M2 | SYSTOLIC BLOOD PRESSURE: 118 MMHG | HEART RATE: 76 BPM | TEMPERATURE: 98 F

## 2020-06-24 DIAGNOSIS — Z79.4 TYPE 2 DIABETES MELLITUS WITH HYPERGLYCEMIA, WITH LONG-TERM CURRENT USE OF INSULIN (HCC): Primary | ICD-10-CM

## 2020-06-24 DIAGNOSIS — E11.65 TYPE 2 DIABETES MELLITUS WITH HYPERGLYCEMIA, WITH LONG-TERM CURRENT USE OF INSULIN (HCC): Primary | ICD-10-CM

## 2020-06-24 PROCEDURE — 99213 OFFICE O/P EST LOW 20 MIN: CPT | Performed by: NURSE PRACTITIONER

## 2020-06-24 RX ORDER — LOSARTAN POTASSIUM 25 MG/1
TABLET ORAL DAILY
COMMUNITY

## 2020-06-24 NOTE — PROGRESS NOTES
HPI    Patient presents for insulin training. States that he has been on insulin for a significant amount of time but that he wants to ensure that he is administering the medication correctly.   Admits to not using regularly but would like to start using a Quit date: 2002        Years since quittin.4      Smokeless tobacco: Never Used    Substance and Sexual Activity      Alcohol use: No        Alcohol/week: 0.0 standard drinks        Comment: non-drinker      Drug use: No      Sexual activity: Not daily.     • glimepiride 2 MG Oral Tab Take 1 tablet (2 mg total) by mouth daily with breakfast. (Patient not taking: Reported on 6/24/2020 ) 90 tablet 0       Allergies:  No Known Allergies    Physical Exam   Nursing note and vitals reviewed.    Benjyti

## 2020-06-25 ENCOUNTER — OFFICE VISIT (OUTPATIENT)
Dept: FAMILY MEDICINE CLINIC | Facility: CLINIC | Age: 50
End: 2020-06-25
Payer: COMMERCIAL

## 2020-06-25 VITALS
HEIGHT: 67 IN | BODY MASS INDEX: 32.71 KG/M2 | TEMPERATURE: 98 F | DIASTOLIC BLOOD PRESSURE: 71 MMHG | HEART RATE: 80 BPM | WEIGHT: 208.38 LBS | SYSTOLIC BLOOD PRESSURE: 114 MMHG

## 2020-06-25 DIAGNOSIS — E11.65 UNCONTROLLED TYPE 2 DIABETES MELLITUS WITH HYPERGLYCEMIA (HCC): Primary | ICD-10-CM

## 2020-06-25 PROCEDURE — 99214 OFFICE O/P EST MOD 30 MIN: CPT | Performed by: FAMILY MEDICINE

## 2020-06-25 NOTE — PROGRESS NOTES
test6/25/2020  2:26 PM    Julito Peña is a 44year old male. Chief complaint(s): Patient presents with:  Medication Question: has questions regarding a medication     HPI:     Julito Peña primary complaint is regarding diabetes.       P non-drinker    Drug use: No       Immunizations:     Immunization History  Administered            Date(s) Administered    >=3 YRS TRI  MULTIDOSE VIAL (74597) FLU CLINIC                          10/11/2016      TDAP                  08/01/2013      Medicat Sitting, Cuff Size: adult)   Pulse 80   Temp 98.2 °F (36.8 °C) (Oral)   Ht 5' 7\" (1.702 m)   Wt 208 lb 6.4 oz (94.5 kg)   BMI 32.64 kg/m²    HENT:   Head: Normocephalic. Eyes: Conjunctivae are normal. No scleral icterus. Neck: Neck supple.    Dede Mirza daily., Disp: , Rfl:   •  Empagliflozin-linaGLIPtin (GLYXAMBI) 25-5 MG Oral Tab, Take by mouth., Disp: , Rfl:   •  Semaglutide,0.25 or 0.5MG/DOS, (OZEMPIC, 0.25 OR 0.5 MG/DOSE,) 2 MG/1.5ML Subcutaneous Solution Pen-injector, Inject 0.25 mg into the skin on Advised as to the targets of pre-meal glucoses ( mg/dl) and post meal glucoses (<140-160 mg/dl) Home glucose testing discussed. The A1c target of <7% according to ADA and <6.5% according to AACE were discussed.          Orders This Visit:  No orders o

## 2020-06-26 ENCOUNTER — TELEPHONE (OUTPATIENT)
Dept: FAMILY MEDICINE CLINIC | Facility: CLINIC | Age: 50
End: 2020-06-26

## 2020-06-26 DIAGNOSIS — Z79.4 TYPE 2 DIABETES MELLITUS WITH HYPERGLYCEMIA, WITH LONG-TERM CURRENT USE OF INSULIN (HCC): Primary | ICD-10-CM

## 2020-06-26 DIAGNOSIS — E11.65 TYPE 2 DIABETES MELLITUS WITH HYPERGLYCEMIA, WITH LONG-TERM CURRENT USE OF INSULIN (HCC): Primary | ICD-10-CM

## 2020-06-26 NOTE — TELEPHONE ENCOUNTER
No further recommendation, keep up the good work. If glc drops lower cut back on insulin by 5 units.

## 2020-06-26 NOTE — TELEPHONE ENCOUNTER
Used  ID [id number]. Patient calling following his office visit yesterday. He is taking his medications and monitoring his blood sugars. States blood sugar fasting today was 72, then after breakfast was 103.  States he has both a new and an

## 2020-06-26 NOTE — TELEPHONE ENCOUNTER
Order placed for diabetes education. Please give the patient info to call to set up an appt. Cancel appt with me on 6/29 and follow up with pcp in case medications need to be adjusted or changed.

## 2020-06-26 NOTE — TELEPHONE ENCOUNTER
Advised patient of Dr Tellis Koyanagi  note. Patient stated he was using 36 units however for this week he has only been using 28 units.  Advised to continue with the 28 units but if his blood sugar falls below 70 he should reduce the insulin by 5 units to equal

## 2020-06-27 NOTE — TELEPHONE ENCOUNTER
A referral to diabetic education/diet was given on his last visit. He should be checking his sugar TID.

## 2020-06-27 NOTE — TELEPHONE ENCOUNTER
Patient calling back, with Language Candy Maria Del Carmen #[id number], verified name and , advised Severiano Foyer note and verbalized understanding. Central scheduling number given to call [phone] for diabetic education.   Cancelled Monday's appointment with Siri Chau

## 2020-06-27 NOTE — TELEPHONE ENCOUNTER
With  Mukesh Orr #[id number],JKZQ message to call back on mobile number. Mobile number voce mail is female voice. Home number no voice mail. No MyChart. This is second try. RN=follow up on Monday.

## 2020-06-29 NOTE — TELEPHONE ENCOUNTER
With Lithuanian Interpretor, advised patient of Dr. Jesús Quesada note. Patient verbalized understanding. Transferred patient to scheduling.

## 2020-07-01 ENCOUNTER — HOSPITAL ENCOUNTER (OUTPATIENT)
Dept: ENDOCRINOLOGY | Facility: HOSPITAL | Age: 50
Discharge: HOME | End: 2020-07-01
Attending: NURSE PRACTITIONER
Payer: COMMERCIAL

## 2020-07-01 ENCOUNTER — TELEPHONE (OUTPATIENT)
Dept: FAMILY MEDICINE CLINIC | Facility: CLINIC | Age: 50
End: 2020-07-01

## 2020-07-01 DIAGNOSIS — Z79.4 TYPE 2 DIABETES MELLITUS WITH HYPERGLYCEMIA, WITH LONG-TERM CURRENT USE OF INSULIN (HCC): Primary | ICD-10-CM

## 2020-07-01 DIAGNOSIS — E11.65 TYPE 2 DIABETES MELLITUS WITH HYPERGLYCEMIA, WITH LONG-TERM CURRENT USE OF INSULIN (HCC): Primary | ICD-10-CM

## 2020-07-01 NOTE — PATIENT INSTRUCTIONS
Goals     1.  EDC - Healthy (pt-stated)      Note created  7/1/2020  9:06 AM by Lorena Llamas, RN     I will write down my meals and snacks for 5 days

## 2020-07-01 NOTE — TELEPHONE ENCOUNTER
Patient needs to follow up with Dr Tiffany Root, the provider who ordered his medications for further instruction and adjustment. Please cancel appt with me and advise.

## 2020-07-01 NOTE — TELEPHONE ENCOUNTER
Patient calling and states for the last two days his sugar have been low and he will like to know how much insulin should he use he being using 18 units of insulins because it have been low patient need a  or some to speak Welsh when they call

## 2020-07-01 NOTE — TELEPHONE ENCOUNTER
Interpretor [id number]. Patient called back. Name and  verified. Checking sugars 2-3 times daily    Sugars 70-84 in the morning. Afternoon/evenin-100    Bedtime- 110-120. Taking 28 units, lately only 18 units nightly.     Saw diabetic educat

## 2020-07-01 NOTE — PROGRESS NOTES
Julito Willisraul  : 1970 was seen for Diabetic Education Insulin instruction:Lantus, Ozempic    Date: 2020   Start time: 8:11 am End time: 9:00 am      Assessment:     Assessment:  Patient arrived at appointment 10 minutes late.  Language li at room temperature. He stated he is changing his pen needles with each injection  Instructed him to count to \"10\" after completion of injection to assure medication is delivered properly. Ozempic 0.25 mg weekly.   He did a return demonstration of [address] [place] [address]

## 2020-07-02 ENCOUNTER — VIRTUAL PHONE E/M (OUTPATIENT)
Dept: FAMILY MEDICINE CLINIC | Facility: CLINIC | Age: 50
End: 2020-07-02
Payer: COMMERCIAL

## 2020-07-02 DIAGNOSIS — E11.65 UNCONTROLLED TYPE 2 DIABETES MELLITUS WITH HYPERGLYCEMIA (HCC): Primary | ICD-10-CM

## 2020-07-02 PROCEDURE — 99213 OFFICE O/P EST LOW 20 MIN: CPT | Performed by: FAMILY MEDICINE

## 2020-07-02 NOTE — PROGRESS NOTES
Virtual Telephone Check-In    Foster Sanchez verbally consents to a Virtual/Telephone Check-In visit on 07/02/20. Patient has been referred to the [facility] website at [email or web address] to review the yearly Consent to Treat document.     Patient un diet, accu-check BID- TID. FOLLOW-UP: Schedule a follow-up visit in  [address] [place] [address].     Karey Galeazzi, MD

## 2020-07-15 ENCOUNTER — TELEPHONE (OUTPATIENT)
Dept: FAMILY MEDICINE CLINIC | Facility: CLINIC | Age: 50
End: 2020-07-15

## 2020-07-15 NOTE — TELEPHONE ENCOUNTER
Patient speaks Danish. Patient would like a call back today to discuss the directions below. Please advise.     Semaglutide,0.25 or 0.5MG/DOS, (OZEMPIC, 0.25 OR 0.5 MG/DOSE,) 2 MG/1.5ML Subcutaneous Solution Pen-injector 4 pen 1 6/18/2020    Sig: Merly Fox

## 2020-07-16 NOTE — TELEPHONE ENCOUNTER
Dr. Rehana Simms: are you able to call patient. [phone]     Patient feels his glucose fasting is low. Taking Semaglutide 0.25mg. Lantus insulin 15 units. His FBS 80-85; This morning is was 94.      Also when patient is driving, his RT foot feels ting

## 2020-07-17 NOTE — TELEPHONE ENCOUNTER
Spoke to patient. He will decrease lantus to 10 units. He c/o of foot pain/tingling. appt made for Saturday.

## 2020-07-18 ENCOUNTER — OFFICE VISIT (OUTPATIENT)
Dept: FAMILY MEDICINE CLINIC | Facility: CLINIC | Age: 50
End: 2020-07-18
Payer: COMMERCIAL

## 2020-07-18 VITALS
HEIGHT: 67 IN | TEMPERATURE: 98 F | WEIGHT: 213.25 LBS | RESPIRATION RATE: 19 BRPM | BODY MASS INDEX: 33.47 KG/M2 | HEART RATE: 80 BPM | DIASTOLIC BLOOD PRESSURE: 62 MMHG | SYSTOLIC BLOOD PRESSURE: 102 MMHG

## 2020-07-18 DIAGNOSIS — E11.65 UNCONTROLLED TYPE 2 DIABETES MELLITUS WITH HYPERGLYCEMIA (HCC): Primary | ICD-10-CM

## 2020-07-18 PROCEDURE — 3074F SYST BP LT 130 MM HG: CPT | Performed by: FAMILY MEDICINE

## 2020-07-18 PROCEDURE — 3008F BODY MASS INDEX DOCD: CPT | Performed by: FAMILY MEDICINE

## 2020-07-18 PROCEDURE — 3078F DIAST BP <80 MM HG: CPT | Performed by: FAMILY MEDICINE

## 2020-07-18 PROCEDURE — 99213 OFFICE O/P EST LOW 20 MIN: CPT | Performed by: FAMILY MEDICINE

## 2020-07-18 NOTE — PROGRESS NOTES
7/18/2020  12:33 PM    Ady Cedeno is a 44year old male. Chief complaint(s): Patient presents with:  Medication Follow-Up: Present for questions regardin insulin medication.    Musculoskeletal Problem: C/o right leg tingling and feeling leg tir date: 2002        Years since quittin.5      Smokeless tobacco: Never Used    Alcohol use: No      Alcohol/week: 0.0 standard drinks      Comment: non-drinker    Drug use: No       Immunizations:     Immunization History  Administered            D headaches. Psychiatric/Behavioral: Negative for depressed mood. PHYSICAL EXAM:   Physical Exam    Constitutional: He appears well-developed and well-nourished.    /62 (BP Location: Right arm, Patient Position: Sitting, Cuff Size: large)   Puls diabetes mellitus with hyperglycemia (hcc)  (primary encounter diagnosis)    DIABETES A&P    LABORATORY & ORDERS: Blood test(s) ordered today ; No orders of the defined types were placed in this encounter.     Additional orders include: Stopped Glemiperide orders of the defined types were placed in this encounter.       Meds This Visit:  Requested Prescriptions      No prescriptions requested or ordered in this encounter       Imaging & Referrals:  None         Aung Obrien MD

## 2020-07-25 ENCOUNTER — HOSPITAL ENCOUNTER (OUTPATIENT)
Dept: ENDOCRINOLOGY | Facility: HOSPITAL | Age: 50
Discharge: HOME | End: 2020-07-25
Attending: NURSE PRACTITIONER
Payer: COMMERCIAL

## 2020-07-25 DIAGNOSIS — Z79.4 TYPE 2 DIABETES MELLITUS WITH HYPERGLYCEMIA, WITH LONG-TERM CURRENT USE OF INSULIN (HCC): Primary | ICD-10-CM

## 2020-07-25 DIAGNOSIS — E11.65 TYPE 2 DIABETES MELLITUS WITH HYPERGLYCEMIA, WITH LONG-TERM CURRENT USE OF INSULIN (HCC): Primary | ICD-10-CM

## 2020-07-25 NOTE — PROGRESS NOTES
Shannan Part  : 1970 was seen for Diabetic Education Individual Follow up:    Date: 2020   Start time: Presented 20 min late!  10:50A End time: 12:00 noon     used for appt, as pt is primarily Portuguese speaking  Patient of  injecting properly    Difficulties: None; Language Barrier  Comments: Concerned with low blood sugars  Use of computers/smartphones/apps: Yes - limited apps  Last time educated: <6 months  Last dilated eye exam: will need to review again next visit  Last f of complications reviewed including eye, dental, CV health, renal protection, and foot care discussed. Health Coping  Family involvement/support encouraged    Recommendations:      1. Follow recommended meal plan.    2. Test BG fasting and 2 hours post m

## 2020-09-28 RX ORDER — GLIMEPIRIDE 2 MG/1
TABLET ORAL
Qty: 90 TABLET | Refills: 0 | Status: SHIPPED | OUTPATIENT
Start: 2020-09-28 | End: 2021-01-22

## 2020-10-15 ENCOUNTER — OFFICE VISIT (OUTPATIENT)
Dept: FAMILY MEDICINE CLINIC | Facility: CLINIC | Age: 50
End: 2020-10-15
Payer: COMMERCIAL

## 2020-10-15 VITALS
TEMPERATURE: 98 F | RESPIRATION RATE: 18 BRPM | DIASTOLIC BLOOD PRESSURE: 76 MMHG | HEIGHT: 67 IN | WEIGHT: 212.38 LBS | SYSTOLIC BLOOD PRESSURE: 120 MMHG | BODY MASS INDEX: 33.33 KG/M2 | HEART RATE: 76 BPM

## 2020-10-15 DIAGNOSIS — E11.65 UNCONTROLLED TYPE 2 DIABETES MELLITUS WITH HYPERGLYCEMIA (HCC): Primary | ICD-10-CM

## 2020-10-15 PROCEDURE — 36416 COLLJ CAPILLARY BLOOD SPEC: CPT | Performed by: FAMILY MEDICINE

## 2020-10-15 PROCEDURE — 3074F SYST BP LT 130 MM HG: CPT | Performed by: FAMILY MEDICINE

## 2020-10-15 PROCEDURE — 83036 HEMOGLOBIN GLYCOSYLATED A1C: CPT | Performed by: FAMILY MEDICINE

## 2020-10-15 PROCEDURE — 3078F DIAST BP <80 MM HG: CPT | Performed by: FAMILY MEDICINE

## 2020-10-15 PROCEDURE — 99213 OFFICE O/P EST LOW 20 MIN: CPT | Performed by: FAMILY MEDICINE

## 2020-10-15 PROCEDURE — 3008F BODY MASS INDEX DOCD: CPT | Performed by: FAMILY MEDICINE

## 2020-10-15 PROCEDURE — 82962 GLUCOSE BLOOD TEST: CPT | Performed by: FAMILY MEDICINE

## 2020-10-15 NOTE — PROGRESS NOTES
10/15/2020  4:39 PM    Jayson Cardoso is a 44year old male. Chief complaint(s): Patient presents with:  Diabetes: f/u    HPI:     Jayson Cardoso primary complaint is regarding DIabetes.      Patient Aime Webb is a 53 year old male is here Immunizations:     Immunization History  Administered            Date(s) Administered    >=3 YRS TRI  MULTIDOSE VIAL (50880) FLU CLINIC                          10/11/2016      TDAP                  08/01/2013      Medications (Active prior to today's Wt 212 lb 6.4 oz (96.3 kg)   BMI 33.27 kg/m²     Physical Exam    Constitutional: He appears well-developed and well-nourished. HENT:   Head: Normocephalic. Eyes: Conjunctivae are normal. No scleral icterus. Neck: Neck supple.    Cardiovascular: Norm the skin nightly. Box on 32 BD needles (Patient taking differently: Inject 23 Units into the skin nightly.  Box on 32 BD needles ), Disp: 6 pen, Rfl: 2  •  PARoxetine HCl 10 MG Oral Tab, Take 10 mg by mouth., Disp: , Rfl:   •  Empagliflozin-linaGLIPtin (GLY

## 2020-10-19 ENCOUNTER — NURSE TRIAGE (OUTPATIENT)
Dept: FAMILY MEDICINE CLINIC | Facility: CLINIC | Age: 50
End: 2020-10-19

## 2020-10-19 NOTE — TELEPHONE ENCOUNTER
With Malay interpretor, advised patient of Dr. Johnna Medina note. Patient verbalized understanding and will go urgent care in [place].

## 2020-10-19 NOTE — TELEPHONE ENCOUNTER
Action Requested: Summary for Provider     []  Critical Lab, Recommendations Needed  [] Need Additional Advice  []   FYI    [x]   Need Orders  [] Need Medications Sent to Pharmacy  []  Other     SUMMARY: Patient is requesting an order for imaging due to he

## 2020-10-20 NOTE — TELEPHONE ENCOUNTER
Reviewed chart. There is not urgent care note for an [facility] site. Call conducted with [facility] [place]  ID [id number]. It was very hard to hear the patient. It sounded like he was outside. Patient said he was at work.  He said he did not go last night but will g

## 2020-10-20 NOTE — TELEPHONE ENCOUNTER
Reviewed chart. No [facility] note. [id number] Kp Yang. Left message to call back for update.

## 2020-11-07 ENCOUNTER — NURSE TRIAGE (OUTPATIENT)
Dept: FAMILY MEDICINE CLINIC | Facility: CLINIC | Age: 50
End: 2020-11-07

## 2020-11-07 NOTE — TELEPHONE ENCOUNTER
Action Requested: Summary for Provider     []  Critical Lab, Recommendations Needed  [x] Need Additional Advice  [x]   FYI    []   Need Orders  [] Need Medications Sent to Pharmacy  []  Other     SUMMARY: Patient informed to go to ER for head injury two we

## 2020-11-19 ENCOUNTER — NURSE TRIAGE (OUTPATIENT)
Dept: FAMILY MEDICINE CLINIC | Facility: CLINIC | Age: 50
End: 2020-11-19

## 2020-11-19 NOTE — TELEPHONE ENCOUNTER
Action Requested: Summary for Provider     []  Critical Lab, Recommendations Needed  [] Need Additional Advice  []   FYI    []   Need Orders  [] Need Medications Sent to Pharmacy  []  Other     SUMMARY: with Language Line , Cornelia Angeles, agent # [id number]

## 2020-11-19 NOTE — TELEPHONE ENCOUNTER
With [employer], [place], agent # [id number] left message for pt to call back, transfer to triage.

## 2020-11-20 ENCOUNTER — HOSPITAL ENCOUNTER (EMERGENCY)
Facility: HOSPITAL | Age: 50
Discharge: HOME | End: 2020-11-20
Attending: EMERGENCY MEDICINE
Payer: COMMERCIAL

## 2020-11-20 ENCOUNTER — APPOINTMENT (OUTPATIENT)
Dept: CT IMAGING | Facility: HOSPITAL | Age: 50
End: 2020-11-20
Attending: EMERGENCY MEDICINE
Payer: COMMERCIAL

## 2020-11-20 VITALS
OXYGEN SATURATION: 96 % | RESPIRATION RATE: 20 BRPM | HEART RATE: 55 BPM | BODY MASS INDEX: 33 KG/M2 | TEMPERATURE: 98 F | WEIGHT: 208 LBS | SYSTOLIC BLOOD PRESSURE: 139 MMHG | DIASTOLIC BLOOD PRESSURE: 75 MMHG

## 2020-11-20 DIAGNOSIS — R42 DIZZINESS: Primary | ICD-10-CM

## 2020-11-20 PROCEDURE — 81003 URINALYSIS AUTO W/O SCOPE: CPT | Performed by: EMERGENCY MEDICINE

## 2020-11-20 PROCEDURE — 93010 ELECTROCARDIOGRAM REPORT: CPT | Performed by: EMERGENCY MEDICINE

## 2020-11-20 PROCEDURE — 96361 HYDRATE IV INFUSION ADD-ON: CPT

## 2020-11-20 PROCEDURE — 93005 ELECTROCARDIOGRAM TRACING: CPT

## 2020-11-20 PROCEDURE — 70450 CT HEAD/BRAIN W/O DYE: CPT | Performed by: EMERGENCY MEDICINE

## 2020-11-20 PROCEDURE — 85025 COMPLETE CBC W/AUTO DIFF WBC: CPT | Performed by: EMERGENCY MEDICINE

## 2020-11-20 PROCEDURE — 99285 EMERGENCY DEPT VISIT HI MDM: CPT

## 2020-11-20 PROCEDURE — 96374 THER/PROPH/DIAG INJ IV PUSH: CPT

## 2020-11-20 PROCEDURE — 80048 BASIC METABOLIC PNL TOTAL CA: CPT | Performed by: EMERGENCY MEDICINE

## 2020-11-20 PROCEDURE — 82962 GLUCOSE BLOOD TEST: CPT

## 2020-11-20 RX ORDER — MECLIZINE HYDROCHLORIDE 25 MG/1
25 TABLET ORAL 3 TIMES DAILY PRN
Qty: 20 TABLET | Refills: 0 | Status: SHIPPED | OUTPATIENT
Start: 2020-11-20

## 2020-11-20 RX ORDER — MECLIZINE HYDROCHLORIDE 25 MG/1
25 TABLET ORAL ONCE
Status: COMPLETED | OUTPATIENT
Start: 2020-11-20 | End: 2020-11-20

## 2020-11-20 RX ORDER — ONDANSETRON 2 MG/ML
4 INJECTION INTRAMUSCULAR; INTRAVENOUS ONCE
Status: COMPLETED | OUTPATIENT
Start: 2020-11-20 | End: 2020-11-20

## 2020-11-20 RX ORDER — ONDANSETRON 4 MG/1
4 TABLET, ORALLY DISINTEGRATING ORAL EVERY 4 HOURS PRN
Qty: 10 TABLET | Refills: 0 | Status: SHIPPED | OUTPATIENT
Start: 2020-11-20 | End: 2020-11-27

## 2020-11-20 NOTE — TELEPHONE ENCOUNTER
FYI-- pt was seen in ER yesterday 11/19/20.         Disposition and Plan      Clinical Impression:  Dizziness  (primary encounter diagnosis)     Disposition:  Discharge  11/20/2020 10:40 am     Follow-up:  Maryann Hampton MD  [address] [place] [address] [address]  A

## 2020-11-20 NOTE — ED PROVIDER NOTES
Patient Seen in: [facility] AND [facility] Emergency Department      History   Patient presents with:  Diabetes    Stated Complaint: HYPERGLYCEMIA     HPI    57-year-old male with history of diabetes and hyperlipidemia presents with complaints of dizziness, naus normal-appearing.   Respiratory: there are no retractions, lungs are clear to auscultation  Cardiovascular: regular rate and rhythm  Gastrointestinal:  abdomen is soft and non tender, no masses, bowel sounds normal  Neurological: Speech normal.  Cranial ner reevaluation. He is advised to return if his symptoms are worsening.                         Disposition and Plan     Clinical Impression:  Dizziness  (primary encounter diagnosis)    Disposition:  Discharge  11/20/2020 10:40 am    Follow-up:  Emily Davey

## 2020-11-20 NOTE — ED INITIAL ASSESSMENT (HPI)
PATIENT AOX3 TO ED VIA PRIVAT EVEHICLE PATIENT CO OF UNCONTROLLABLE DIABETES X LATELY, REQUESTING TO GET \"CHECKED UP\". GLUCOSE 88 IN TRIAGE. PATIENT REPORTED GLUCOSE RANGE .  +VOMITING X LAST NIGHT

## 2020-11-23 NOTE — TELEPHONE ENCOUNTER
Patient requesting referral to see a phycologist. Patient states he doesn't feel him self, feels he gets mad very easily and can not control his anger. Feeling anxious and desperate. Patient was asked he had any shortness of breath.  Patient states he can b

## 2020-12-01 ENCOUNTER — TELEPHONE (OUTPATIENT)
Dept: ADMINISTRATIVE | Age: 50
End: 2020-12-01

## 2020-12-08 ENCOUNTER — LAB ENCOUNTER (OUTPATIENT)
Dept: LAB | Age: 50
End: 2020-12-08
Attending: NURSE PRACTITIONER
Payer: COMMERCIAL

## 2020-12-08 DIAGNOSIS — I10 HTN (HYPERTENSION): ICD-10-CM

## 2020-12-08 DIAGNOSIS — D69.6 THROMBOCYTOPENIA, UNSPECIFIED (HCC): Primary | ICD-10-CM

## 2020-12-08 DIAGNOSIS — E78.5 HYPERLIPEMIA: ICD-10-CM

## 2020-12-08 DIAGNOSIS — E11.9 DIABETES MELLITUS (HCC): ICD-10-CM

## 2020-12-08 PROCEDURE — 83036 HEMOGLOBIN GLYCOSYLATED A1C: CPT

## 2020-12-08 PROCEDURE — 80061 LIPID PANEL: CPT

## 2020-12-08 PROCEDURE — 85025 COMPLETE CBC W/AUTO DIFF WBC: CPT

## 2020-12-08 PROCEDURE — 80053 COMPREHEN METABOLIC PANEL: CPT

## 2020-12-08 PROCEDURE — 82043 UR ALBUMIN QUANTITATIVE: CPT

## 2020-12-08 PROCEDURE — 36415 COLL VENOUS BLD VENIPUNCTURE: CPT

## 2020-12-08 PROCEDURE — 82570 ASSAY OF URINE CREATININE: CPT

## 2020-12-23 ENCOUNTER — TELEPHONE (OUTPATIENT)
Dept: FAMILY MEDICINE CLINIC | Facility: CLINIC | Age: 50
End: 2020-12-23

## 2020-12-23 NOTE — TELEPHONE ENCOUNTER
Language Line # [id number]    Spoke with patient ( verified)--reports he has not been feeling well for the past 2-3 months, vomiting every time he eats. \"I still feel the same--dizzy and vomiting every time I eat.  I was in the ER last month (see 2020

## 2020-12-24 NOTE — TELEPHONE ENCOUNTER
Patient reports did not go to ED yesterday, reports has already gone to ED and they don't do anything else for his symptoms.  Reports symptoms a little improved today, has been resting, took a dose of Meclizine and Zofran now for dizziness and stomachache,

## 2020-12-28 NOTE — TELEPHONE ENCOUNTER
Per [employer] #[id number], Reports no improvement and having difficulty sleeping. Asking for appointment today. Offered to schedule patient with multiple providers, he refused. Advised to ER if symptoms worsen.

## 2020-12-28 NOTE — TELEPHONE ENCOUNTER
Please advise when patient can be seen this week as you previously advised? Ok to double book or Can 11am procedure slot for tomorrow be used if patient is available? Or Saturday block? Thank you.

## 2020-12-29 ENCOUNTER — OFFICE VISIT (OUTPATIENT)
Dept: FAMILY MEDICINE CLINIC | Facility: CLINIC | Age: 50
End: 2020-12-29
Payer: COMMERCIAL

## 2020-12-29 VITALS
DIASTOLIC BLOOD PRESSURE: 75 MMHG | HEART RATE: 69 BPM | HEIGHT: 67 IN | SYSTOLIC BLOOD PRESSURE: 126 MMHG | WEIGHT: 209 LBS | BODY MASS INDEX: 32.8 KG/M2

## 2020-12-29 DIAGNOSIS — E11.65 TYPE 2 DIABETES MELLITUS WITH HYPERGLYCEMIA, WITH LONG-TERM CURRENT USE OF INSULIN (HCC): Primary | ICD-10-CM

## 2020-12-29 DIAGNOSIS — Z79.4 TYPE 2 DIABETES MELLITUS WITH HYPERGLYCEMIA, WITH LONG-TERM CURRENT USE OF INSULIN (HCC): Primary | ICD-10-CM

## 2020-12-29 DIAGNOSIS — F51.01 PRIMARY INSOMNIA: ICD-10-CM

## 2020-12-29 DIAGNOSIS — T78.40XA ALLERGIC REACTION, INITIAL ENCOUNTER: ICD-10-CM

## 2020-12-29 PROCEDURE — 3074F SYST BP LT 130 MM HG: CPT | Performed by: FAMILY MEDICINE

## 2020-12-29 PROCEDURE — 3008F BODY MASS INDEX DOCD: CPT | Performed by: FAMILY MEDICINE

## 2020-12-29 PROCEDURE — 3078F DIAST BP <80 MM HG: CPT | Performed by: FAMILY MEDICINE

## 2020-12-29 PROCEDURE — 99214 OFFICE O/P EST MOD 30 MIN: CPT | Performed by: FAMILY MEDICINE

## 2020-12-29 RX ORDER — TEMAZEPAM 30 MG/1
30 CAPSULE ORAL NIGHTLY PRN
Qty: 30 CAPSULE | Refills: 1 | Status: SHIPPED | OUTPATIENT
Start: 2020-12-29

## 2020-12-29 RX ORDER — DIPHENHYDRAMINE HCL 25 MG
50 TABLET ORAL EVERY 8 HOURS PRN
Qty: 25 TABLET | Refills: 0 | Status: SHIPPED | OUTPATIENT
Start: 2020-12-29 | End: 2021-01-06 | Stop reason: ALTCHOICE

## 2020-12-29 NOTE — TELEPHONE ENCOUNTER
Appointment Information   Name: Rajinder Fairbanks MRN: [id number]   Date: 12/29/2020 Status: Bryan   Appt Time: 4:00 PM Length: 15   Visit Type: FOLLOW UP - EXTENDED [7165] Copay: $0.00   Provider: Corrine Rodriguez MD Department: [facility] MED   Ref

## 2020-12-29 NOTE — PROGRESS NOTES
12/29/2020  3:56 PM    Jayson Ghotra is a 44year old male. Chief complaint(s): Patient presents with:   Insomnia: x 3 weeks  Rash: face  Leg Pain: bilateral leg pain    HPI:     Jayson Ghotra primary complaint is regarding multiple complai apnea. See medication list for a list of her current prescriptions. Remedies that she has already tried include OTC sleep aids. Holly Fernandez is a 44year old male who presents with a rash. Symptoms have been present for 4 days.  This rash h MG Oral Tab Take 1 tablet (25 mg total) by mouth 3 (three) times daily as needed. 20 tablet 0   • GLIMEPIRIDE 2 MG Oral Tab TAKE 1 TABLET BY MOUTH DAILY WITH BREAKFAST 90 tablet 0   • Losartan Potassium 25 MG Oral Tab Take by mouth daily.      • Empaglifloz supple. Cardiovascular: Normal rate, regular rhythm, S1 normal and S2 normal.    Pulmonary/Chest: Effort normal and breath sounds normal.   Skin: No rash noted. Psychiatric: His mood appears anxious.        LABORATORY RESULTS:   No results found for: UR fL    MCH 27.6 26.0 - 34.0 pg    MCHC 33.0 31.0 - 37.0 g/dL    RDW-SD 40.6 35.1 - 46.3 fL    RDW 13.2 11.0 - 15.0 %    PLT 95.0 (L) 150.0 - 450.0 10(3)uL    Neutrophil Absolute Prelim 4.46 1.50 - 7.70 x10 (3) uL    Neutrophil Absolute 4.46 1.50 - 7.70 x10( 2 MG/1.5ML Subcutaneous Solution Pen-injector, Inject 0.25 mg into the skin once a week. Initially, 0.25 mg subcutaneously once every 7 days (weekly). Administer the dose at any time of day, with or without meals.  After 4 weeks increase the dose to 0.5 mg given include: avoid sleeping on back, avoid alcohol, avoid caffeine, adhere to a proper sleep hygiene schedule, get regular exercise, maintain a sleep diary, reduce stress. FOLLOW-UP: Advised to call if there is no improvement in 7 day(s).    Schedule f

## 2020-12-31 ENCOUNTER — TELEPHONE (OUTPATIENT)
Dept: FAMILY MEDICINE CLINIC | Facility: CLINIC | Age: 50
End: 2020-12-31

## 2020-12-31 NOTE — TELEPHONE ENCOUNTER
Action Requested: Summary for Provider     []  Critical Lab, Recommendations Needed  [] Need Additional Advice  []   FYI    []   Need Orders  [] Need Medications Sent to Pharmacy  []  Other     SUMMARY: pt going to ER for eval      Pt calling to states he

## 2021-01-04 ENCOUNTER — TELEPHONE (OUTPATIENT)
Dept: FAMILY MEDICINE CLINIC | Facility: CLINIC | Age: 51
End: 2021-01-04

## 2021-01-04 ENCOUNTER — TELEPHONE (OUTPATIENT)
Dept: LAB | Facility: HOSPITAL | Age: 51
End: 2021-01-04

## 2021-01-04 DIAGNOSIS — Z02.9 ENCOUNTERS FOR ADMINISTRATIVE PURPOSE: Primary | ICD-10-CM

## 2021-01-04 NOTE — TELEPHONE ENCOUNTER
Language line assisted with this phone call. Pt states that he has similar symptoms to when he previously called on 12/31. At that time his symptoms subsided so he didn't go to the ER.  Today his symptoms including: nausea, dizziness, light headness, weakne

## 2021-01-04 NOTE — TELEPHONE ENCOUNTER
RN from PCPs office calling triage line regarding patients symptoms of dizziness, lightheaded, and nausea. Wondering where to send patient, IC vs ED.  Instructed RN patient needs evaluation in the ED, not the Immediate Care regarding symptoms, understands,

## 2021-01-05 NOTE — TELEPHONE ENCOUNTER
Pt contacted and informed should go to ER, as per below. Pt states is still having the symptoms although abdominal pain has decreased.  Pt states does not understand why he needs to go to ER when per pt he had already seen Dr Dang Padgett about the abdominal pa

## 2021-01-05 NOTE — TELEPHONE ENCOUNTER
Pt informed of Dr Monika Carballo response below but pt states had already called back and got an appt for tomorrow so will keep that.

## 2021-01-06 ENCOUNTER — OFFICE VISIT (OUTPATIENT)
Dept: FAMILY MEDICINE CLINIC | Facility: CLINIC | Age: 51
End: 2021-01-06
Payer: COMMERCIAL

## 2021-01-06 ENCOUNTER — TELEPHONE (OUTPATIENT)
Dept: FAMILY MEDICINE CLINIC | Facility: CLINIC | Age: 51
End: 2021-01-06

## 2021-01-06 VITALS
WEIGHT: 208.19 LBS | HEIGHT: 67 IN | DIASTOLIC BLOOD PRESSURE: 69 MMHG | BODY MASS INDEX: 32.67 KG/M2 | HEART RATE: 73 BPM | SYSTOLIC BLOOD PRESSURE: 145 MMHG

## 2021-01-06 DIAGNOSIS — M79.10 MYALGIA: ICD-10-CM

## 2021-01-06 DIAGNOSIS — E11.42 TYPE 2 DIABETES MELLITUS WITH DIABETIC POLYNEUROPATHY, WITHOUT LONG-TERM CURRENT USE OF INSULIN (HCC): ICD-10-CM

## 2021-01-06 DIAGNOSIS — F32.0 CURRENT MILD EPISODE OF MAJOR DEPRESSIVE DISORDER, UNSPECIFIED WHETHER RECURRENT (HCC): ICD-10-CM

## 2021-01-06 DIAGNOSIS — M15.9 PRIMARY OSTEOARTHRITIS INVOLVING MULTIPLE JOINTS: Primary | ICD-10-CM

## 2021-01-06 PROCEDURE — 3008F BODY MASS INDEX DOCD: CPT | Performed by: FAMILY MEDICINE

## 2021-01-06 PROCEDURE — 99213 OFFICE O/P EST LOW 20 MIN: CPT | Performed by: FAMILY MEDICINE

## 2021-01-06 PROCEDURE — 3078F DIAST BP <80 MM HG: CPT | Performed by: FAMILY MEDICINE

## 2021-01-06 PROCEDURE — 3077F SYST BP >= 140 MM HG: CPT | Performed by: FAMILY MEDICINE

## 2021-01-06 RX ORDER — NAPROXEN 500 MG/1
500 TABLET ORAL 2 TIMES DAILY WITH MEALS
Qty: 60 TABLET | Refills: 0 | Status: SHIPPED | OUTPATIENT
Start: 2021-01-06 | End: 2021-02-15

## 2021-01-06 RX ORDER — GABAPENTIN 100 MG/1
100 CAPSULE ORAL 3 TIMES DAILY
Qty: 90 CAPSULE | Refills: 1 | Status: SHIPPED | OUTPATIENT
Start: 2021-01-06

## 2021-01-06 RX ORDER — GABAPENTIN 100 MG/1
100 CAPSULE ORAL
COMMUNITY
Start: 2021-01-05 | End: 2021-01-18

## 2021-01-06 NOTE — PROGRESS NOTES
1/6/2021  10:48 AM    Holley Westfall is a 44year old male. Chief complaint(s): Patient presents with:  Weakness: weakness to bilateral legs   depression  HPI:     Holley Westfall primary complaint is regarding as above.      Patient to be chris Alcohol use: No      Alcohol/week: 0.0 standard drinks      Comment: non-drinker    Drug use: No       Immunizations:     Immunization History  Administered            Date(s) Administered    >=3 YRS TRI  MULTIDOSE VIAL (39948) FLU CLINIC Allergies      ROS:   Review of Systems   Constitutional: Negative for chills, fatigue and fever. Respiratory: Negative for shortness of breath and wheezing. Cardiovascular: Negative for chest pain and palpitations.    Musculoskeletal: Positive for rolan This Encounter      PHYSICAL THERAPY - at [address] given include: Patient was reassured of  his medical condition and all questions and concerns were answered.  Patient was informed to please, call our office with any new or f

## 2021-01-06 NOTE — TELEPHONE ENCOUNTER
On call note 1/4/2021-daughter Nehemias You calling with concerns for father-states he is feeling week and having problems walking. Has fallen a couple of times recently. Has h/o diabetes. Has to crawl bc he cannot bear weight on his feet.    S/s have not worsened

## 2021-01-07 NOTE — TELEPHONE ENCOUNTER
Message # [phone]         2021 05:12p   [COLLEENS]  To:  From:  PAULETTE Bolaños MD:  Phone#:  ----------------------------------------------------------------------  Ben Gilliam [phone] RE PT  [address],  1970, PT VERY WEAK, CAN NOT MOVE HIS F

## 2021-01-08 ENCOUNTER — TELEPHONE (OUTPATIENT)
Dept: FAMILY MEDICINE CLINIC | Facility: CLINIC | Age: 51
End: 2021-01-08

## 2021-01-09 ENCOUNTER — NURSE TRIAGE (OUTPATIENT)
Dept: FAMILY MEDICINE CLINIC | Facility: CLINIC | Age: 51
End: 2021-01-09

## 2021-01-09 NOTE — TELEPHONE ENCOUNTER
Call transferred to RN with  stating patient calling with complaint of weakness and tingling in feet and cheeks. Patient  not on call when call transferred. Called patient back. Patient not available . Left message to call back.

## 2021-01-09 NOTE — TELEPHONE ENCOUNTER
Action Requested: Summary for Provider     []  Critical Lab, Recommendations Needed  [] Need Additional Advice  [x]   FYI    []   Need Orders  [] Need Medications Sent to Pharmacy  []  Other     SUMMARY: With , spouse Nerissa(on hipaa)

## 2021-01-11 NOTE — TELEPHONE ENCOUNTER
Message # [phone]         2021 08:32p   [KIRTP]  To:  Nick Skelton  From:  APOLINAR Bolaños MD:  Phone#:  [phone]  ----------------------------------------------------------------------   1970 RE: HEADACHE          (Message Delivered

## 2021-01-11 NOTE — TELEPHONE ENCOUNTER
Left message to call back. Please transfer to triage. 1st attempt. No ER noted.      Noted pt has a appt on 1/13 reason FMLA and F/u     Future Appointments   Date Time Provider Pj Calle   1/13/2021 10:45 AM [facility]

## 2021-01-12 NOTE — TELEPHONE ENCOUNTER
With  Theodora Loving ID#, left message to call back. No ER visit seen, patient has appointment 1/13/21 with Michelle Barnett.

## 2021-01-12 NOTE — TELEPHONE ENCOUNTER
Patient called on 01/08/21 and reported that he started having headache today, the pain is at a severity of 5/10. Patient denies of fever, cough, sore throat, visual changes, N/V, chest pain, SOB. Patient has not tried Tylenol or Ibuprofen.    Plan: Advise

## 2021-01-14 ENCOUNTER — TELEPHONE (OUTPATIENT)
Dept: FAMILY MEDICINE CLINIC | Facility: CLINIC | Age: 51
End: 2021-01-14

## 2021-01-14 DIAGNOSIS — Z20.822 CLOSE EXPOSURE TO COVID-19 VIRUS: Primary | ICD-10-CM

## 2021-01-14 NOTE — TELEPHONE ENCOUNTER
Spoke to patient and he would like to know if he can get a covid antibody test done. He's not having any symptoms and he hasn't come in contact with anyone that has had covid.  But he states that in Dec 2019 he was sick and would like to know if it was [facility]

## 2021-01-14 NOTE — TELEPHONE ENCOUNTER
Rwandan speaking patient has questions regarding blood work. He will like to know if there is a blood test he can take that'll let him know if and when he had covid.  Please advise

## 2021-01-15 ENCOUNTER — NURSE TRIAGE (OUTPATIENT)
Dept: FAMILY MEDICINE CLINIC | Facility: CLINIC | Age: 51
End: 2021-01-15

## 2021-01-15 DIAGNOSIS — Z20.822 EXPOSURE TO COVID-19 VIRUS: Primary | ICD-10-CM

## 2021-01-15 NOTE — TELEPHONE ENCOUNTER
Action Requested: Summary for Provider     []  Critical Lab, Recommendations Needed  [x] Need Additional Advice  []   FYI    [x]   Need Orders  [] Need Medications Sent to Pharmacy  []  Other     SUMMARY: Patient calling with complaint  of shortness of baljinder

## 2021-01-16 ENCOUNTER — HOSPITAL ENCOUNTER (OUTPATIENT)
Age: 51
Discharge: HOME | End: 2021-01-16
Attending: PHYSICIAN ASSISTANT
Payer: COMMERCIAL

## 2021-01-16 ENCOUNTER — APPOINTMENT (OUTPATIENT)
Dept: GENERAL RADIOLOGY | Age: 51
End: 2021-01-16
Attending: PHYSICIAN ASSISTANT
Payer: COMMERCIAL

## 2021-01-16 VITALS
HEART RATE: 71 BPM | DIASTOLIC BLOOD PRESSURE: 78 MMHG | SYSTOLIC BLOOD PRESSURE: 131 MMHG | RESPIRATION RATE: 20 BRPM | TEMPERATURE: 99 F | OXYGEN SATURATION: 97 %

## 2021-01-16 DIAGNOSIS — R06.00 DYSPNEA, UNSPECIFIED TYPE: Primary | ICD-10-CM

## 2021-01-16 DIAGNOSIS — Z20.822 ENCOUNTER FOR LABORATORY TESTING FOR COVID-19 VIRUS: ICD-10-CM

## 2021-01-16 LAB
#MXD IC: 0.4 X10ˆ3/UL (ref 0.1–1)
BASOPHILS # BLD AUTO: 0.03 X10(3) UL (ref 0–0.2)
BASOPHILS NFR BLD AUTO: 0.4 %
CREAT BLD-MCNC: 0.7 MG/DL (ref 0.7–1.3)
DDIMER WHOLE BLOOD: <200 NG/ML DDU (ref ?–400)
DEPRECATED RDW RBC AUTO: 39.4 FL (ref 35.1–46.3)
EOSINOPHIL # BLD AUTO: 0.06 X10(3) UL (ref 0–0.7)
EOSINOPHIL NFR BLD AUTO: 0.9 %
ERYTHROCYTE [DISTWIDTH] IN BLOOD BY AUTOMATED COUNT: 13 % (ref 11–15)
GLUCOSE BLD-MCNC: 101 MG/DL (ref 70–99)
HCT VFR BLD AUTO: 44.3 %
HCT VFR BLD AUTO: 45.2 %
HGB BLD-MCNC: 14.7 G/DL
HGB BLD-MCNC: 15 G/DL
IMM GRANULOCYTES # BLD AUTO: 0.02 X10(3) UL (ref 0–1)
IMM GRANULOCYTES NFR BLD: 0.3 %
ISTAT BUN: 17 MG/DL (ref 7–18)
ISTAT CHLORIDE: 104 MMOL/L (ref 98–112)
ISTAT HEMATOCRIT: 46 %
ISTAT IONIZED CALCIUM FOR CHEM 8: 1.2 MMOL/L (ref 1.12–1.32)
ISTAT POTASSIUM: 4 MMOL/L (ref 3.6–5.1)
ISTAT SODIUM: 139 MMOL/L (ref 136–145)
ISTAT TCO2: 24 MMOL/L (ref 21–32)
LYMPHOCYTES # BLD AUTO: 1.88 X10(3) UL (ref 1–4)
LYMPHOCYTES # BLD AUTO: 1.9 X10ˆ3/UL (ref 1–4)
LYMPHOCYTES NFR BLD AUTO: 27.6 %
LYMPHOCYTES NFR BLD AUTO: 29.9 %
MCH RBC QN AUTO: 27.6 PG (ref 26–34)
MCH RBC QN AUTO: 28.3 PG (ref 26–34)
MCHC RBC AUTO-ENTMCNC: 33.2 G/DL (ref 31–37)
MCHC RBC AUTO-ENTMCNC: 33.2 G/DL (ref 31–37)
MCV RBC AUTO: 83.2 FL
MCV RBC AUTO: 85.2 FL (ref 80–100)
MIXED CELL %: 6.9 %
MONOCYTES # BLD AUTO: 0.54 X10(3) UL (ref 0.1–1)
MONOCYTES NFR BLD AUTO: 7.9 %
NEUTROPHILS # BLD AUTO: 4.2 X10ˆ3/UL (ref 1.5–7.7)
NEUTROPHILS # BLD AUTO: 4.29 X10 (3) UL (ref 1.5–7.7)
NEUTROPHILS # BLD AUTO: 4.29 X10(3) UL (ref 1.5–7.7)
NEUTROPHILS NFR BLD AUTO: 62.9 %
NEUTROPHILS NFR BLD AUTO: 63.2 %
PLATELET # BLD AUTO: 113 10(3)UL (ref 150–450)
RBC # BLD AUTO: 5.2 X10ˆ6/UL
RBC # BLD AUTO: 5.43 X10(6)UL
SARS-COV-2 RNA RESP QL NAA+PROBE: NOT DETECTED
TROPONIN I BLD-MCNC: <0.02 NG/ML
WBC # BLD AUTO: 6.5 X10ˆ3/UL (ref 4–11)
WBC # BLD AUTO: 6.8 X10(3) UL (ref 4–11)

## 2021-01-16 PROCEDURE — 93010 ELECTROCARDIOGRAM REPORT: CPT

## 2021-01-16 PROCEDURE — 93010 ELECTROCARDIOGRAM REPORT: CPT | Performed by: PHYSICIAN ASSISTANT

## 2021-01-16 PROCEDURE — 71046 X-RAY EXAM CHEST 2 VIEWS: CPT | Performed by: PHYSICIAN ASSISTANT

## 2021-01-16 PROCEDURE — 85378 FIBRIN DEGRADE SEMIQUANT: CPT | Performed by: PHYSICIAN ASSISTANT

## 2021-01-16 PROCEDURE — 84484 ASSAY OF TROPONIN QUANT: CPT

## 2021-01-16 PROCEDURE — 99214 OFFICE O/P EST MOD 30 MIN: CPT

## 2021-01-16 PROCEDURE — 99215 OFFICE O/P EST HI 40 MIN: CPT

## 2021-01-16 PROCEDURE — 36415 COLL VENOUS BLD VENIPUNCTURE: CPT

## 2021-01-16 PROCEDURE — 85025 COMPLETE CBC W/AUTO DIFF WBC: CPT | Performed by: PHYSICIAN ASSISTANT

## 2021-01-16 PROCEDURE — 93005 ELECTROCARDIOGRAM TRACING: CPT

## 2021-01-16 PROCEDURE — 80047 BASIC METABLC PNL IONIZED CA: CPT

## 2021-01-16 NOTE — ED INITIAL ASSESSMENT (HPI)
PATIENT ARRIVED AMBULATORY TO ROOM WITH A CANE C/O SYMPTOMS THAT STARTED 4-5 DAYS AGO. +SOB. INTERMITTENT COUGH. NO FEVERS. NO NASAL CONGESTION. NO CHEST PAIN.  EASY NON LABORED RESPIRATIONS

## 2021-01-16 NOTE — ED PROVIDER NOTES
Patient Seen in: [facility]    History   Patient presents with:  Shortness Of Breath    Stated Complaint: shortness of breath    HPI      Castro Merlos is a 44year old male who presents to immediate care with chief complaint of dyspne the dose to 0.5 mg subcutaneously once weekly. metFORMIN HCl 850 MG Oral Tab,  Take 1 tablet (850 mg total) by mouth 2 (two) times daily with meals.    insulin glargine (LANTUS SOLOSTAR) 100 UNIT/ML Subcutaneous Solution Pen-injector,  Inject 28 Units int membranes moist.  Neck: The neck is supple. There is no evidence of JVD. No meningeal signs. Chest: There is no tenderness to the chest wall. No CVA tenderness bilaterally.   Respiratory: Respiratory effort was normal.  There is no rales, wheezes, or rh days  For follow-up    We recommend that you schedule follow up care with a primary care provider within the next three months to obtain basic health screening including reassessment of your blood pressure.     Medications Prescribed:  Current Discharge Med

## 2021-01-18 ENCOUNTER — VIRTUAL PHONE E/M (OUTPATIENT)
Dept: FAMILY MEDICINE CLINIC | Facility: CLINIC | Age: 51
End: 2021-01-18
Payer: COMMERCIAL

## 2021-01-18 ENCOUNTER — TELEPHONE (OUTPATIENT)
Dept: FAMILY MEDICINE CLINIC | Facility: CLINIC | Age: 51
End: 2021-01-18

## 2021-01-18 DIAGNOSIS — Z20.822 EXPOSURE TO COVID-19 VIRUS: Primary | ICD-10-CM

## 2021-01-18 PROCEDURE — 99213 OFFICE O/P EST LOW 20 MIN: CPT | Performed by: FAMILY MEDICINE

## 2021-01-18 NOTE — TELEPHONE ENCOUNTER
Patient triaged on 1/15, was seen in [address] [place] [address] on Sat 1/16. Rapid Covid test neg. Patient has appt today at 2:45pm in office, please confirm ok to keep as office visit. *notify patient once recommendations confirmed.        Pt was seen in the [facility] on 1/16/

## 2021-01-18 NOTE — TELEPHONE ENCOUNTER
Pt was seen in the [facility] on 1/16/21. Rapid covid test there was negative.     Clinical Impression:  Dyspnea, unspecified type  (primary encounter diagnosis)  Encounter for laboratory testing for COVID-19 virus     Disposition:  Discharge     Follow-up:

## 2021-01-18 NOTE — TELEPHONE ENCOUNTER
With NGA Ross  Noah Crigler ID# [id number]. Identified patient's name and . Patient states he informed Dr. Jarrod Arriola today at the virtual appt that he had chest pains and shortness of breath. States these symptoms come and go.   Advised patient t

## 2021-01-18 NOTE — PROGRESS NOTES
Virtual Telephone Check-In    [facility] verbally consents to a Virtual/Telephone Check-In visit on 01/18/21. Patient has been referred to the [facility] website at [email or web address] to review the yearly Consent to Treat document.     Patient un

## 2021-01-20 NOTE — TELEPHONE ENCOUNTER
Clarified with patient that his office visit he scheduled for Mon 1/25 is f/u for his symptoms of chest pain and SOB. Patient seen in  1/16, had virtual visit with Dr Theresa Wallace 1/18.  Patient reports he told Dr Theresa Wallace he was having chest pain and sob, sy

## 2021-01-22 ENCOUNTER — OFFICE VISIT (OUTPATIENT)
Dept: FAMILY MEDICINE CLINIC | Facility: CLINIC | Age: 51
End: 2021-01-22
Payer: COMMERCIAL

## 2021-01-22 VITALS
WEIGHT: 210 LBS | HEART RATE: 76 BPM | SYSTOLIC BLOOD PRESSURE: 120 MMHG | BODY MASS INDEX: 32.96 KG/M2 | HEIGHT: 67 IN | DIASTOLIC BLOOD PRESSURE: 80 MMHG

## 2021-01-22 DIAGNOSIS — F41.9 ANXIETY: ICD-10-CM

## 2021-01-22 DIAGNOSIS — R07.9 CHEST PAIN, UNSPECIFIED TYPE: Primary | ICD-10-CM

## 2021-01-22 PROCEDURE — 3079F DIAST BP 80-89 MM HG: CPT | Performed by: FAMILY MEDICINE

## 2021-01-22 PROCEDURE — 93000 ELECTROCARDIOGRAM COMPLETE: CPT | Performed by: FAMILY MEDICINE

## 2021-01-22 PROCEDURE — 3008F BODY MASS INDEX DOCD: CPT | Performed by: FAMILY MEDICINE

## 2021-01-22 PROCEDURE — 3074F SYST BP LT 130 MM HG: CPT | Performed by: FAMILY MEDICINE

## 2021-01-22 PROCEDURE — 99214 OFFICE O/P EST MOD 30 MIN: CPT | Performed by: FAMILY MEDICINE

## 2021-01-22 RX ORDER — GLIMEPIRIDE 2 MG/1
2 TABLET ORAL
Qty: 90 TABLET | Refills: 2 | Status: SHIPPED | OUTPATIENT
Start: 2021-01-22

## 2021-01-22 RX ORDER — INSULIN GLARGINE 100 [IU]/ML
23 INJECTION, SOLUTION SUBCUTANEOUS NIGHTLY
Qty: 5 PEN | Refills: 2 | Status: SHIPPED | OUTPATIENT
Start: 2021-01-22

## 2021-01-22 RX ORDER — EMPAGLIFLOZIN AND LINAGLIPTIN 25; 5 MG/1; MG/1
1 TABLET, FILM COATED ORAL DAILY
Qty: 90 TABLET | Refills: 1 | Status: SHIPPED | OUTPATIENT
Start: 2021-01-22

## 2021-01-22 RX ORDER — SEMAGLUTIDE 1.34 MG/ML
0.25 INJECTION, SOLUTION SUBCUTANEOUS WEEKLY
Qty: 4 PEN | Refills: 1 | Status: SHIPPED | OUTPATIENT
Start: 2021-01-22

## 2021-01-22 NOTE — PROGRESS NOTES
1/22/2021  9:24 AM    Ines Godoy is a 44year old male. Chief complaint(s): Patient presents with: Anxiety: sleep problems.    Medication Request: refills on medication    HPI:     Ines Godoy primary complaint is regarding multiple c tablet 2   • insulin glargine (LANTUS SOLOSTAR) 100 UNIT/ML Subcutaneous Solution Pen-injector Inject 23 Units into the skin nightly.  Box on 32 BD needles 5 pen 2   • Semaglutide,0.25 or 0.5MG/DOS, (OZEMPIC, 0.25 OR 0.5 MG/DOSE,) 2 MG/1.5ML Subcutaneous So No scleral icterus. Neck: Neck supple. Cardiovascular: Normal rate, regular rhythm, S1 normal and S2 normal.    Pulmonary/Chest: Effort normal and breath sounds normal.   Skin: No rash noted.        LABORATORY RESULTS:   No results found for: [place] 112 mmol/L    ISTAT Ionized Calcium 1.20 1. 12 - 1.32 mmol/L    ISTAT Hematocrit 46 37 - 53 %    ISTAT Glucose 101 (H) 70 - 99 mg/dL    ISTAT TCO2 24 21 - 32 mmol/L    ISTAT Creatinine 0.70 0.70 - 1.30 mg/dL    GFR, Non- 109 >=60    GFR, Afr SOLOSTAR) 100 UNIT/ML Subcutaneous Solution Pen-injector, Inject 23 Units into the skin nightly.  Box on 32 BD needles, Disp: 5 pen, Rfl: 2  •  Semaglutide,0.25 or 0.5MG/DOS, (OZEMPIC, 0.25 OR 0.5 MG/DOSE,) 2 MG/1.5ML Subcutaneous Solution Pen-injector, Inj subcutaneously once every 7 days (weekly). Administer the dose at any time of day, with or without meals. After 4 weeks increase the dose to 0.5 mg subcutaneously once weekly.      REFERRALS: ELECTROCARDIOGRAM, COMPLETE  OP REFERRAL TO PSYCHIATRY, Orders Pl Lindy Darden MD

## 2021-01-27 ENCOUNTER — NURSE TRIAGE (OUTPATIENT)
Dept: FAMILY MEDICINE CLINIC | Facility: CLINIC | Age: 51
End: 2021-01-27

## 2021-01-27 NOTE — TELEPHONE ENCOUNTER
Action Requested: Summary for Provider     []  Critical Lab, Recommendations Needed  [] Need Additional Advice  []   FYI    []   Need Orders  [] Need Medications Sent to Pharmacy  []  Other     SUMMARY: Per protocol advised : OV  Cannot come in today   Fut

## 2021-01-28 ENCOUNTER — OFFICE VISIT (OUTPATIENT)
Dept: FAMILY MEDICINE CLINIC | Facility: CLINIC | Age: 51
End: 2021-01-28
Payer: COMMERCIAL

## 2021-01-28 ENCOUNTER — TELEPHONE (OUTPATIENT)
Dept: PHYSICAL THERAPY | Facility: HOSPITAL | Age: 51
End: 2021-01-28

## 2021-01-28 VITALS
TEMPERATURE: 98 F | HEART RATE: 66 BPM | SYSTOLIC BLOOD PRESSURE: 136 MMHG | HEIGHT: 67 IN | DIASTOLIC BLOOD PRESSURE: 72 MMHG | BODY MASS INDEX: 32.96 KG/M2 | WEIGHT: 210 LBS

## 2021-01-28 DIAGNOSIS — N50.811 PAIN IN BOTH TESTICLES: ICD-10-CM

## 2021-01-28 DIAGNOSIS — R30.0 DYSURIA: Primary | ICD-10-CM

## 2021-01-28 DIAGNOSIS — N50.812 PAIN IN BOTH TESTICLES: ICD-10-CM

## 2021-01-28 LAB
APPEARANCE: CLEAR
BILIRUBIN: NEGATIVE
GLUCOSE (URINE DIPSTICK): NEGATIVE MG/DL
KETONES (URINE DIPSTICK): NEGATIVE MG/DL
LEUKOCYTES: NEGATIVE
MULTISTIX LOT#: 1044 NUMERIC
NITRITE, URINE: NEGATIVE
OCCULT BLOOD: NEGATIVE
PH, URINE: 7 (ref 4.5–8)
PROTEIN (URINE DIPSTICK): NEGATIVE MG/DL
SPECIFIC GRAVITY: 1.01 (ref 1–1.03)
URINE-COLOR: YELLOW
UROBILINOGEN,SEMI-QN: 0.2 MG/DL (ref 0–1.9)

## 2021-01-28 PROCEDURE — 3008F BODY MASS INDEX DOCD: CPT | Performed by: FAMILY MEDICINE

## 2021-01-28 PROCEDURE — 99214 OFFICE O/P EST MOD 30 MIN: CPT | Performed by: FAMILY MEDICINE

## 2021-01-28 PROCEDURE — 3078F DIAST BP <80 MM HG: CPT | Performed by: FAMILY MEDICINE

## 2021-01-28 PROCEDURE — 3075F SYST BP GE 130 - 139MM HG: CPT | Performed by: FAMILY MEDICINE

## 2021-01-28 PROCEDURE — 81003 URINALYSIS AUTO W/O SCOPE: CPT | Performed by: FAMILY MEDICINE

## 2021-01-28 RX ORDER — LEVOFLOXACIN 500 MG/1
500 TABLET, FILM COATED ORAL DAILY
Qty: 10 TABLET | Refills: 0 | Status: SHIPPED | OUTPATIENT
Start: 2021-01-28 | End: 2021-02-07

## 2021-01-28 RX ORDER — NAPROXEN 500 MG/1
500 TABLET ORAL 2 TIMES DAILY PRN
Qty: 14 TABLET | Refills: 0 | Status: SHIPPED | OUTPATIENT
Start: 2021-01-28 | End: 2021-03-19 | Stop reason: ALTCHOICE

## 2021-01-29 NOTE — PROGRESS NOTES
Patient presents with:  Low Back Pain  genital pain: c/o genital pain and dysuria    HPI:   Ej Mcfarland is a 44year old male who presents to clinic with complaints of testicular mild ache, with dysuria, urinary frequency, urgency without blood in mouth 2 (two) times daily as needed. Dispense: 14 tablet; Refill: 0  - URINE CULTURE, ROUTINE     RTC if no improvement in symptoms. Red flags discussed to go to FIDEL Mathis MD  1/28/2021  6:33 PM

## 2021-02-01 ENCOUNTER — TELEPHONE (OUTPATIENT)
Dept: PHYSICAL THERAPY | Facility: HOSPITAL | Age: 51
End: 2021-02-01

## 2021-02-01 ENCOUNTER — APPOINTMENT (OUTPATIENT)
Dept: PHYSICAL THERAPY | Age: 51
End: 2021-02-01
Attending: FAMILY MEDICINE
Payer: COMMERCIAL

## 2021-02-04 ENCOUNTER — OFFICE VISIT (OUTPATIENT)
Dept: PHYSICAL THERAPY | Age: 51
End: 2021-02-04
Attending: FAMILY MEDICINE
Payer: COMMERCIAL

## 2021-02-04 DIAGNOSIS — E11.42 TYPE 2 DIABETES MELLITUS WITH DIABETIC POLYNEUROPATHY, WITHOUT LONG-TERM CURRENT USE OF INSULIN (HCC): ICD-10-CM

## 2021-02-04 DIAGNOSIS — M15.9 PRIMARY OSTEOARTHRITIS INVOLVING MULTIPLE JOINTS: ICD-10-CM

## 2021-02-04 DIAGNOSIS — M79.10 MYALGIA: ICD-10-CM

## 2021-02-04 PROCEDURE — 97110 THERAPEUTIC EXERCISES: CPT | Performed by: PHYSICAL THERAPIST

## 2021-02-04 NOTE — PROGRESS NOTES
P.T. EVALUATION:   Referring Physician: Dr. Elan Michel  Diagnosis: Myalgia (M79.10)  Primary osteoarthritis involving multiple joints (M89.49)  Type 2 diabetes mellitus with diabetic polyneuropathy, without long-term current use of insulin (HCC) (E11.42) other trunk motions are minimally limited. Back pain produced with repeated trunk motion. B UE ROM is WFL  B LE ROM is [facility]    Flexibility: Decreased trunk flexibility into all planes.  (+) tightness B hamstrings and calf  (+) tightness B shoulder elevation visits over a 90 day period and as patient's schedule allows.  Treatment will include: Modalities prn, manual therapy, therapeutic exercises, therapeutic activity, and instructions on a home program.    Education or treatment limitation: None    Rehab Ana

## 2021-02-08 ENCOUNTER — TELEPHONE (OUTPATIENT)
Dept: PHYSICAL THERAPY | Facility: HOSPITAL | Age: 51
End: 2021-02-08

## 2021-02-08 ENCOUNTER — APPOINTMENT (OUTPATIENT)
Dept: PHYSICAL THERAPY | Age: 51
End: 2021-02-08
Attending: FAMILY MEDICINE
Payer: COMMERCIAL

## 2021-02-11 ENCOUNTER — APPOINTMENT (OUTPATIENT)
Dept: PHYSICAL THERAPY | Age: 51
End: 2021-02-11
Attending: FAMILY MEDICINE
Payer: COMMERCIAL

## 2021-02-11 ENCOUNTER — TELEPHONE (OUTPATIENT)
Dept: PHYSICAL THERAPY | Age: 51
End: 2021-02-11

## 2021-02-15 ENCOUNTER — OFFICE VISIT (OUTPATIENT)
Dept: FAMILY MEDICINE CLINIC | Facility: CLINIC | Age: 51
End: 2021-02-15
Payer: COMMERCIAL

## 2021-02-15 ENCOUNTER — TELEPHONE (OUTPATIENT)
Dept: PHYSICAL THERAPY | Age: 51
End: 2021-02-15

## 2021-02-15 ENCOUNTER — APPOINTMENT (OUTPATIENT)
Dept: PHYSICAL THERAPY | Age: 51
End: 2021-02-15
Attending: FAMILY MEDICINE
Payer: COMMERCIAL

## 2021-02-15 VITALS
WEIGHT: 212 LBS | HEART RATE: 84 BPM | DIASTOLIC BLOOD PRESSURE: 77 MMHG | HEIGHT: 67 IN | SYSTOLIC BLOOD PRESSURE: 129 MMHG | BODY MASS INDEX: 33.27 KG/M2

## 2021-02-15 DIAGNOSIS — M79.10 MYALGIA: ICD-10-CM

## 2021-02-15 DIAGNOSIS — F41.9 ANXIETY: ICD-10-CM

## 2021-02-15 DIAGNOSIS — M15.9 PRIMARY OSTEOARTHRITIS INVOLVING MULTIPLE JOINTS: Primary | ICD-10-CM

## 2021-02-15 PROCEDURE — 3008F BODY MASS INDEX DOCD: CPT | Performed by: FAMILY MEDICINE

## 2021-02-15 PROCEDURE — 3078F DIAST BP <80 MM HG: CPT | Performed by: FAMILY MEDICINE

## 2021-02-15 PROCEDURE — 3074F SYST BP LT 130 MM HG: CPT | Performed by: FAMILY MEDICINE

## 2021-02-15 PROCEDURE — 99213 OFFICE O/P EST LOW 20 MIN: CPT | Performed by: FAMILY MEDICINE

## 2021-02-15 NOTE — PROGRESS NOTES
2/15/2021  4:35 PM    Castro Merlos is a 44year old male. Chief complaint(s): Patient presents with: Follow - Up: back pain, dizziness, and vomitting     HPI:     Castro Merlos primary complaint is regarding multiple complaoints.      Trent Mendoza daily. 90 tablet 1   • glimepiride 2 MG Oral Tab Take 1 tablet (2 mg total) by mouth daily with breakfast. 90 tablet 2   • insulin glargine (LANTUS SOLOSTAR) 100 UNIT/ML Subcutaneous Solution Pen-injector Inject 23 Units into the skin nightly.  Box on 32 BD Pulmonary/Chest: Effort normal and breath sounds normal.   Skin: No rash noted. Psychiatric: He has a normal mood and affect. LABORATORY RESULTS:     EKG / Spirometry : -     Radiology: No results found.      ASSESSMENT/PLAN:   Assessment   [facility] Psychiatrist and follow up with PT.     RECOMMENDATIONS given include: Patient was reassured of  his medical condition and all questions and concerns were answered.  Patient was informed to please, call our office with any new or further questions or concer

## 2021-02-18 ENCOUNTER — OFFICE VISIT (OUTPATIENT)
Dept: PHYSICAL THERAPY | Age: 51
End: 2021-02-18
Attending: FAMILY MEDICINE
Payer: COMMERCIAL

## 2021-02-18 PROCEDURE — 97110 THERAPEUTIC EXERCISES: CPT | Performed by: PHYSICAL THERAPIST

## 2021-02-18 PROCEDURE — 97140 MANUAL THERAPY 1/> REGIONS: CPT | Performed by: PHYSICAL THERAPIST

## 2021-02-22 ENCOUNTER — APPOINTMENT (OUTPATIENT)
Dept: PHYSICAL THERAPY | Age: 51
End: 2021-02-22
Attending: FAMILY MEDICINE
Payer: COMMERCIAL

## 2021-02-23 ENCOUNTER — HOSPITAL ENCOUNTER (EMERGENCY)
Facility: HOSPITAL | Age: 51
Discharge: HOME | End: 2021-02-23
Attending: EMERGENCY MEDICINE
Payer: COMMERCIAL

## 2021-02-23 ENCOUNTER — APPOINTMENT (OUTPATIENT)
Dept: GENERAL RADIOLOGY | Facility: HOSPITAL | Age: 51
End: 2021-02-23
Payer: COMMERCIAL

## 2021-02-23 VITALS
HEART RATE: 63 BPM | SYSTOLIC BLOOD PRESSURE: 122 MMHG | DIASTOLIC BLOOD PRESSURE: 64 MMHG | OXYGEN SATURATION: 95 % | BODY MASS INDEX: 32.18 KG/M2 | HEIGHT: 67 IN | TEMPERATURE: 98 F | WEIGHT: 205 LBS | RESPIRATION RATE: 19 BRPM

## 2021-02-23 DIAGNOSIS — R07.9 CHEST PAIN OF UNCERTAIN ETIOLOGY: Primary | ICD-10-CM

## 2021-02-23 LAB
ANION GAP SERPL CALC-SCNC: 6 MMOL/L (ref 0–18)
BASOPHILS # BLD AUTO: 0.03 X10(3) UL (ref 0–0.2)
BASOPHILS NFR BLD AUTO: 0.4 %
BUN BLD-MCNC: 16 MG/DL (ref 7–18)
BUN/CREAT SERPL: 21.9 (ref 10–20)
CALCIUM BLD-MCNC: 8.7 MG/DL (ref 8.5–10.1)
CHLORIDE SERPL-SCNC: 108 MMOL/L (ref 98–112)
CO2 SERPL-SCNC: 27 MMOL/L (ref 21–32)
CREAT BLD-MCNC: 0.73 MG/DL
DEPRECATED RDW RBC AUTO: 39.7 FL (ref 35.1–46.3)
EOSINOPHIL # BLD AUTO: 0.08 X10(3) UL (ref 0–0.7)
EOSINOPHIL NFR BLD AUTO: 1.1 %
ERYTHROCYTE [DISTWIDTH] IN BLOOD BY AUTOMATED COUNT: 12.9 % (ref 11–15)
GLUCOSE BLD-MCNC: 107 MG/DL (ref 70–99)
HCT VFR BLD AUTO: 41.9 %
HGB BLD-MCNC: 13.9 G/DL
IMM GRANULOCYTES # BLD AUTO: 0.01 X10(3) UL (ref 0–1)
IMM GRANULOCYTES NFR BLD: 0.1 %
LYMPHOCYTES # BLD AUTO: 2.31 X10(3) UL (ref 1–4)
LYMPHOCYTES NFR BLD AUTO: 31.9 %
MCH RBC QN AUTO: 28 PG (ref 26–34)
MCHC RBC AUTO-ENTMCNC: 33.2 G/DL (ref 31–37)
MCV RBC AUTO: 84.5 FL
MONOCYTES # BLD AUTO: 0.64 X10(3) UL (ref 0.1–1)
MONOCYTES NFR BLD AUTO: 8.8 %
NEUTROPHILS # BLD AUTO: 4.18 X10 (3) UL (ref 1.5–7.7)
NEUTROPHILS # BLD AUTO: 4.18 X10(3) UL (ref 1.5–7.7)
NEUTROPHILS NFR BLD AUTO: 57.7 %
OSMOLALITY SERPL CALC.SUM OF ELEC: 294 MOSM/KG (ref 275–295)
PLATELET # BLD AUTO: 114 10(3)UL (ref 150–450)
POTASSIUM SERPL-SCNC: 3.8 MMOL/L (ref 3.5–5.1)
RBC # BLD AUTO: 4.96 X10(6)UL
SODIUM SERPL-SCNC: 141 MMOL/L (ref 136–145)
TROPONIN I SERPL-MCNC: <0.045 NG/ML (ref ?–0.04)
TROPONIN I SERPL-MCNC: <0.045 NG/ML (ref ?–0.04)
WBC # BLD AUTO: 7.3 X10(3) UL (ref 4–11)

## 2021-02-23 PROCEDURE — 93010 ELECTROCARDIOGRAM REPORT: CPT | Performed by: EMERGENCY MEDICINE

## 2021-02-23 PROCEDURE — 84484 ASSAY OF TROPONIN QUANT: CPT

## 2021-02-23 PROCEDURE — 71045 X-RAY EXAM CHEST 1 VIEW: CPT | Performed by: EMERGENCY MEDICINE

## 2021-02-23 PROCEDURE — 85025 COMPLETE CBC W/AUTO DIFF WBC: CPT | Performed by: EMERGENCY MEDICINE

## 2021-02-23 PROCEDURE — 36415 COLL VENOUS BLD VENIPUNCTURE: CPT

## 2021-02-23 PROCEDURE — 80048 BASIC METABOLIC PNL TOTAL CA: CPT | Performed by: EMERGENCY MEDICINE

## 2021-02-23 PROCEDURE — 99284 EMERGENCY DEPT VISIT MOD MDM: CPT

## 2021-02-23 PROCEDURE — 84484 ASSAY OF TROPONIN QUANT: CPT | Performed by: EMERGENCY MEDICINE

## 2021-02-23 PROCEDURE — 85025 COMPLETE CBC W/AUTO DIFF WBC: CPT

## 2021-02-23 PROCEDURE — 80048 BASIC METABOLIC PNL TOTAL CA: CPT

## 2021-02-23 PROCEDURE — 93005 ELECTROCARDIOGRAM TRACING: CPT

## 2021-02-24 NOTE — ED INITIAL ASSESSMENT (HPI)
Patient here with L sided chest pain starting today. Pain radiating down L arm. Patient having intermittent dizziness. Patient denies shortness of breath.

## 2021-02-24 NOTE — ED PROVIDER NOTES
Patient Seen in: [facility] AND [facility] Emergency Department    History   Patient presents with:  Chest Pain Angina      HPI    69-year-old male presents the ER with complaint of left-sided chest pressure. Patient with a past medical history of diabetes.   Lakesha measures to prevent infection transmission during my interaction with the patient were taken. The patient was already wearing a droplet mask on my arrival to the room.  Personal protective equipment including droplet mask, eye protection, and gloves were wo DIFFERENTIAL WITH PLATELET.   Procedure                               Abnormality         Status                     ---------                               -----------         ------                     CBC W/ DIFFERENTIAL[[id number]]          Abnormal to the complexity of this ED evaluation.     HEART score for chest pain  History- (Highly suspicious 2pt, Mod 1pt, slightly 0pt)        0  ECG- (significant ST deviation 2pt, Non spec 1pt, nl 0pt)  0  AGE- (>65 2pt, 45-64 1 pt, Under 45 0 pt)    1  Risk Fac

## 2021-02-25 ENCOUNTER — APPOINTMENT (OUTPATIENT)
Dept: PHYSICAL THERAPY | Age: 51
End: 2021-02-25
Attending: FAMILY MEDICINE
Payer: COMMERCIAL

## 2021-02-25 ENCOUNTER — TELEPHONE (OUTPATIENT)
Dept: PHYSICAL THERAPY | Facility: HOSPITAL | Age: 51
End: 2021-02-25

## 2021-03-01 ENCOUNTER — APPOINTMENT (OUTPATIENT)
Dept: PHYSICAL THERAPY | Age: 51
End: 2021-03-01
Attending: FAMILY MEDICINE
Payer: COMMERCIAL

## 2021-03-04 ENCOUNTER — APPOINTMENT (OUTPATIENT)
Dept: PHYSICAL THERAPY | Age: 51
End: 2021-03-04
Attending: FAMILY MEDICINE
Payer: COMMERCIAL

## 2021-03-11 ENCOUNTER — OFFICE VISIT (OUTPATIENT)
Dept: FAMILY MEDICINE CLINIC | Facility: CLINIC | Age: 51
End: 2021-03-11
Payer: COMMERCIAL

## 2021-03-11 VITALS
TEMPERATURE: 97 F | HEART RATE: 76 BPM | DIASTOLIC BLOOD PRESSURE: 70 MMHG | HEIGHT: 67 IN | BODY MASS INDEX: 34.84 KG/M2 | SYSTOLIC BLOOD PRESSURE: 121 MMHG | WEIGHT: 222 LBS

## 2021-03-11 DIAGNOSIS — R35.1 NOCTURIA: ICD-10-CM

## 2021-03-11 DIAGNOSIS — N52.9 ERECTILE DYSFUNCTION, UNSPECIFIED ERECTILE DYSFUNCTION TYPE: ICD-10-CM

## 2021-03-11 DIAGNOSIS — N50.82 SCROTAL PAIN: Primary | ICD-10-CM

## 2021-03-11 DIAGNOSIS — M54.50 ACUTE BILATERAL LOW BACK PAIN WITHOUT SCIATICA: ICD-10-CM

## 2021-03-11 PROCEDURE — 3078F DIAST BP <80 MM HG: CPT | Performed by: FAMILY MEDICINE

## 2021-03-11 PROCEDURE — 3074F SYST BP LT 130 MM HG: CPT | Performed by: FAMILY MEDICINE

## 2021-03-11 PROCEDURE — 99214 OFFICE O/P EST MOD 30 MIN: CPT | Performed by: FAMILY MEDICINE

## 2021-03-11 PROCEDURE — 3008F BODY MASS INDEX DOCD: CPT | Performed by: FAMILY MEDICINE

## 2021-03-11 RX ORDER — CYCLOBENZAPRINE HCL 5 MG
5 TABLET ORAL NIGHTLY PRN
Qty: 20 TABLET | Refills: 0 | Status: SHIPPED | OUTPATIENT
Start: 2021-03-11

## 2021-03-11 RX ORDER — NICOTINE POLACRILEX 4 MG/1
1 GUM, CHEWING ORAL EVERY MORNING
Qty: 30 TABLET | Refills: 0 | Status: SHIPPED | OUTPATIENT
Start: 2021-03-11 | End: 2021-03-20

## 2021-03-11 RX ORDER — NAPROXEN 500 MG/1
500 TABLET ORAL 2 TIMES DAILY WITH MEALS
Qty: 30 TABLET | Refills: 0 | Status: SHIPPED | OUTPATIENT
Start: 2021-03-11 | End: 2021-03-20

## 2021-03-12 NOTE — PATIENT INSTRUCTIONS
OMEPRAZOLE - ANTI ACIDO PARA EL ESTOMAGO    NAPROXEN - PARA DOLOR DE ESPALDA, FLOYD Y DOLOR GENITAL     ALFRED JOMAR CON Iris Gutierrez

## 2021-03-12 NOTE — PROGRESS NOTES
Patient presents with:  Back Pain: c/o upper back pain and neck  genital pain    HPI:   Veronika Min is a 44year old male who presents to clinic with complaints of pain in his scrotum.   Denies pain with palpation of his testicles but does feel horacio 0    RTC if no improvement in symptoms. Red flags discussed to go to FIDEL Recio MD  3/11/2021  6:58 PM

## 2021-03-15 ENCOUNTER — APPOINTMENT (OUTPATIENT)
Dept: PHYSICAL THERAPY | Age: 51
End: 2021-03-15
Attending: FAMILY MEDICINE
Payer: COMMERCIAL

## 2021-03-20 ENCOUNTER — TELEPHONE (OUTPATIENT)
Dept: FAMILY MEDICINE CLINIC | Facility: CLINIC | Age: 51
End: 2021-03-20

## 2021-03-20 ENCOUNTER — MOBILE ENCOUNTER (OUTPATIENT)
Dept: FAMILY MEDICINE CLINIC | Facility: CLINIC | Age: 51
End: 2021-03-20

## 2021-03-20 DIAGNOSIS — G47.9 SLEEP DISORDER: ICD-10-CM

## 2021-03-20 DIAGNOSIS — M54.50 ACUTE BILATERAL LOW BACK PAIN WITHOUT SCIATICA: ICD-10-CM

## 2021-03-20 DIAGNOSIS — R10.9 STOMACH PAIN: ICD-10-CM

## 2021-03-20 DIAGNOSIS — N50.82 SCROTAL PAIN: ICD-10-CM

## 2021-03-20 RX ORDER — NAPROXEN 500 MG/1
500 TABLET ORAL 2 TIMES DAILY WITH MEALS
Qty: 30 TABLET | Refills: 0 | Status: SHIPPED | OUTPATIENT
Start: 2021-03-20

## 2021-03-20 RX ORDER — ALPRAZOLAM 0.25 MG/1
0.25 TABLET ORAL NIGHTLY PRN
Qty: 30 TABLET | Refills: 0 | Status: SHIPPED | OUTPATIENT
Start: 2021-03-20

## 2021-03-20 RX ORDER — NICOTINE POLACRILEX 4 MG/1
1 GUM, CHEWING ORAL EVERY MORNING
Qty: 30 TABLET | Refills: 0 | Status: SHIPPED | OUTPATIENT
Start: 2021-03-20 | End: 2021-04-19

## 2021-03-20 NOTE — PROGRESS NOTES
On-call page. Patient wanting to know whether medications were sent to pharmacy, saw Dr. Severiano Skains. Patient unsure which medications.   Saw a patient in the office on 3/11 and sent naproxen and omeprazole to the pharmacy and ask him if he was talking about t

## 2021-03-20 NOTE — TELEPHONE ENCOUNTER
On call page, pt clarified needed alprazolam resent. 1. Sleep disorder  - ALPRAZolam (XANAX) 0.25 MG Oral Tab; Take 1 tablet (0.25 mg total) by mouth nightly as needed for Sleep or Anxiety. To take once per day as needed for anxiety.  Can cause sedation/ f

## 2021-03-22 ENCOUNTER — APPOINTMENT (OUTPATIENT)
Dept: PHYSICAL THERAPY | Age: 51
End: 2021-03-22
Attending: FAMILY MEDICINE
Payer: COMMERCIAL

## 2021-03-27 NOTE — PROGRESS NOTES
HPI:    Patient ID: Maru Perla is a 44year old male. HPI  Pt presenting with dizziness. Martiniquais translation assistance per [employer].  She reports intermittent sensation of room spinning for the last 2 months, that has recently become more freq into the skin nightly. Box on 32 BD needles 5 pen 2   • Semaglutide,0.25 or 0.5MG/DOS, (OZEMPIC, 0.25 OR 0.5 MG/DOSE,) 2 MG/1.5ML Subcutaneous Solution Pen-injector Inject 0.25 mg into the skin once a week.  Initially, 0.25 mg subcutaneously once every 7 da heart sounds, S1 normal and S2 normal. No murmur heard. Pulmonary:      Effort: Pulmonary effort is normal. No respiratory distress. Breath sounds: Normal breath sounds. No wheezing, rhonchi or rales.    Musculoskeletal:      Cervical back: Normal Imaging & Referrals:  OP REFERRAL TO [facility]  XR CERVICAL SPINE (4VIEWS) (CPT=72050)         SK#[id number]

## 2021-05-13 NOTE — LETTER
"Nutrition services: Day 0 of admit.  Lily Nicole is a 21 y.o. female with admitting DX of acute on chronic respiratory failure with hypoxia, anemia due to chronic kidney disease, hyperkalemia, hyponatremia, lupus, hypertension, ESRD    Consult received for BMI <19      Assessment:  Height: 170.2 cm (5' 7\")  Weight: 54.9 kg (121 lb)  Body mass index is 18.95 kg/m²., BMI classification: WNL    Diet/Intake: NPO    Evaluation:   1. RD met w/Pt in her room. Pt reports that UBW= 135 lb (61.2 kg) and Wt loss began 6 months ago with Lupus being active. Wt reviewed in Epic: During prior office visit 5/3 Wt= 55.4 kg (122 lb 3 oz).   2. Pt reports loss of appetite related to Lupus being active 6 months ago. Pt reports adequate PO intake PTA.  3. Labs: Sodium= 132 (L)  4. Meds: reveiwed      Malnutrition Risk: does not meet criteria    Recommendations/Plan:  1. Initiate diet and advance beyond clear liquid diet when medically feasible.  2. After diet is advanced, Encourage intake of >50%   3. Document intake of all meals  as % taken in ADL's to provide interdisciplinary communication across all shifts.   4. Monitor weight.  5. After diet is advanced beyond clear liquid diet, Nutrition rep will continue to see patient for ongoing meal and snack preferences.       RD will follow.        " 4/20/2021              [address] [facility]        [address] [facility]         Dear Ross Menendez,    Our records indicate that the tests ordered for you by Oliver Arechiga MD  have not been done.   If you have, in fact, already completed

## 2021-05-18 NOTE — PROGRESS NOTES
Hematology/Oncology progress note     Followed for VTE prophylaxis after lap band removal     SUBJECTIVE:    Doing well  Had LE cramping & LE venous doppler obtained   Denies overt bleeding  No SOB   Has abdominal wall soreness   Has been ambulatory     OBJECTIVE:    Last Recorded Vitals  Blood pressure 102/63, pulse 95, temperature 98.2 °F (36.8 °C), temperature source Axillary, resp. rate 16, height 5' 3\" (1.6 m), weight 131.9 kg (290 lb 12.6 oz), last menstrual period 05/15/2021, SpO2 97 %.  Body mass index is 51.51 kg/m².      Physical Exam  General: no acute distress, well developed   NECK symmetric, no palpable mass   CV RRR, S1 and S2 normal, no murmurs, pulses are palpable, no peripheral edema   Respiratory breathing is not labored, CTA BL   GI soft, non tender, non distended, no hepatomegaly, no splenomegaly, no palpable mass, incisions are dry/clean   MS no clubbing, no joint deformity   Skin warm, dry, no rash or visible lesions   Psych mood and affect are normal   Neuro CN functions are preserved, motor exam is preserved, no deficits   Lymphatics no palpable LAD in neck, axillary or inguinal area   Medications:  Current Facility-Administered Medications   Medication Dose Route Frequency Provider Last Rate Last Admin   • cyclobenzaprine (FLEXERIL) tablet 2.5 mg  2.5 mg Oral TID PRN Jabier Amin MD       • dextrose (GLUTOSE) 40 % gel 15 g  15 g Oral PRN Jabier Amin MD       • acetaminophen (TYLENOL) tablet 500 mg  500 mg Oral 4 times per day Jabier Amin MD   500 mg at 05/18/21 0546   • morphine injection 2 mg  2 mg Intravenous Q2H PRN Jabier Amin MD       • HYDROcodone-acetaminophen (NORCO) 5-325 MG per tablet 0.5 tablet  0.5 tablet Oral Q4H PRN Jabier Amin MD       • enalaprilat (VASOTEC) injection 1.25 mg  1.25 mg Intravenous Q8H PRN Jabier Amin MD       • ondansetron (ZOFRAN ODT) disintegrating tablet 4 mg  4 mg Oral Q12H PRN Jabier Amin MD        Or   • ondansetron (ZOFRAN)  Letter was sent as a reminder of test injection 4 mg  4 mg Intravenous Q12H PRN Jabier Amin MD       • diphenhydrAMINE (BENADRYL) injection 25 mg  25 mg Intravenous Q6H PRN Jabier Amin MD       • simethicone (MYLICON) tablet 125 mg  125 mg Oral Q4H PRN Jabier Amin MD   125 mg at 05/17/21 1458   • polyethylene glycol (MIRALAX) packet 17 g  17 g Oral Daily PRN Jabier Amin MD       • docusate sodium-sennosides (SENOKOT S) 50-8.6 MG 2 tablet  2 tablet Oral BID PRN Jabier Amin MD       • sodium chloride (NORMAL SALINE) 0.9 % bolus 500 mL  500 mL Intravenous PRN Jabier Amin MD       • sodium chloride 0.9 % flush bag 25 mL  25 mL Intravenous PRN Jabier Amin MD       • enoxaparin (LOVENOX) injection 40 mg  40 mg Subcutaneous Q12H Jabier Amin MD   40 mg at 05/18/21 0546   • metoCLOPramide (REGLAN) tablet 10 mg  10 mg Oral Q6H PRN Jabier Amin MD       • Potassium Standard Replacement Protocol   Does not apply See Admin Instructions Jabier Amin MD       • Magnesium Standard Replacement Protocol   Does not apply See Admin Instructions Jabier Amin MD       • Phosphorus Standard Replacement Protocol   Does not apply See Admin Instructions Jabier Amin MD       • dextrose 50 % injection 25 g  25 g Intravenous PRN Jabier Amin MD       • dextrose 50 % injection 12.5 g  12.5 g Intravenous PRN Jabier Amin MD       • glucagon (GLUCAGEN) injection 1 mg  1 mg Intramuscular PRN Jabier Amin MD       • dextrose (GLUTOSE) 40 % gel 15 g  15 g Oral PRN Jabier Amin MD       • dextrose (GLUTOSE) 40 % gel 30 g  30 g Oral PRN Jabier Amin MD       • metoCLOPramide (REGLAN) injection 10 mg  10 mg Intravenous Q6H PRN Jabier Amin MD       • ALPRAZolam (XANAX) tablet 0.5 mg  0.5 mg Oral Q8H PRN Josseilne Neves MD   0.5 mg at 05/17/21 2050   • fluticasone-vilanterol (BREO ELLIPTA) 100-25 MCG/INH inhaler 1 puff  1 puff Inhalation Daily Resp Josseline Neves MD       • [START ON 5/19/2021] levothyroxine (SYNTHROID, LEVOTHROID) tablet 150  mcg  150 mcg Oral Once per day on Sun Mon Wed Fri Sat Josseline Neves MD       • albuterol (VENTOLIN) nebulizer 2.5 mg  2.5 mg Nebulization Q6H Resp PRN Josseline Neves MD           Labs   Admission on 05/17/2021   Component Date Value Ref Range Status   • URINE PREGNANCY,QUAL 05/17/2021 Negative  Negative Final    verified by Nam. lot#[id number]   • Internal Procedural Controls Accep* 05/17/2021 Yes   Final   • GLUCOSE, BEDSIDE - POINT OF CARE 05/17/2021 107* 70 - 99 mg/dL Final   • Case Report 05/17/2021    Final                    Value:Surgical Pathology                                Case: [id number]                                  Authorizing Provider:  Jabier Amin MD          Collected:           05/17/2021 0835              Ordering Location:     [facility]    Received:            05/17/2021 0924                                     MAIN OR                                                                      Pathologist:           Fer Brooks MD                                                          Specimen:    Other, band system - gross only                                                           • Pathologic Diagnosis 05/17/2021    Final                    Value:This result contains rich text formatting which cannot be displayed here.   • Clinical Information 05/17/2021    Final                    Value:This result contains rich text formatting which cannot be displayed here.   • Gross Description 05/17/2021    Final                    Value:This result contains rich text formatting which cannot be displayed here.   • Disclaimer 05/17/2021    Final                    Value:This result contains rich text formatting which cannot be displayed here.   • GLUCOSE, BEDSIDE - POINT OF CARE 05/17/2021 145* 70 - 99 mg/dL Final   • GLUCOSE, BEDSIDE - POINT OF CARE 05/17/2021 130* 70 - 99 mg/dL Final   • GLUCOSE, BEDSIDE - POINT OF CARE 05/17/2021 130* 70 - 99 mg/dL Final   • Magnesium  05/18/2021 2.4  1.7 - 2.4 mg/dL Final   • Phosphorus 05/18/2021 3.8  2.4 - 4.7 mg/dL Final   • Sodium 05/18/2021 142  135 - 145 mmol/L Final   • Potassium 05/18/2021 3.6  3.4 - 5.1 mmol/L Final   • Chloride 05/18/2021 110* 98 - 107 mmol/L Final   • Carbon Dioxide 05/18/2021 25  21 - 32 mmol/L Final   • Anion Gap 05/18/2021 11  10 - 20 mmol/L Final   • Glucose 05/18/2021 98  65 - 99 mg/dL Final   • BUN 05/18/2021 7  6 - 20 mg/dL Final   • Creatinine 05/18/2021 0.74  0.51 - 0.95 mg/dL Final   • Glomerular Filtration Rate 05/18/2021 >90  >90 mL/min/1.73m2 Final    eGFR results = or >90 mL/min/1.73m2 = Normal kidney function.   • BUN/ Creatinine Ratio 05/18/2021 9  7 - 25 Final   • Calcium 05/18/2021 8.6  8.4 - 10.2 mg/dL Final   • Bilirubin, Total 05/18/2021 0.4  0.2 - 1.0 mg/dL Final   • GOT/AST 05/18/2021 30  <=37 Units/L Final   • GPT/ALT 05/18/2021 34  <64 Units/L Final   • Alkaline Phosphatase 05/18/2021 74  45 - 117 Units/L Final   • Albumin 05/18/2021 3.1* 3.6 - 5.1 g/dL Final   • Protein, Total 05/18/2021 6.9  6.4 - 8.2 g/dL Final   • Globulin 05/18/2021 3.8  2.0 - 4.0 g/dL Final   • A/G Ratio 05/18/2021 0.8* 1.0 - 2.4 Final   • WBC 05/18/2021 12.5* 4.2 - 11.0 K/mcL Final   • RBC 05/18/2021 3.97* 4.00 - 5.20 mil/mcL Final   • HGB 05/18/2021 10.4* 12.0 - 15.5 g/dL Final   • HCT 05/18/2021 33.6* 36.0 - 46.5 % Final   • MCV 05/18/2021 84.6  78.0 - 100.0 fl Final   • MCH 05/18/2021 26.2  26.0 - 34.0 pg Final   • MCHC 05/18/2021 31.0* 32.0 - 36.5 g/dL Final   • RDW-CV 05/18/2021 16.0* 11.0 - 15.0 % Final   • RDW-SD 05/18/2021 49.6  39.0 - 50.0 fL Final   • PLT 05/18/2021 309  140 - 450 K/mcL Final   • NRBC 05/18/2021 0  <=0 /100 WBC Final   • Neutrophil, Percent 05/18/2021 71  % Final   • Lymphocytes, Percent 05/18/2021 19  % Final   • Mono, Percent 05/18/2021 9  % Final   • Eosinophils, Percent 05/18/2021 0  % Final   • Basophils, Percent 05/18/2021 1  % Final   • Immature Granulocytes 05/18/2021 0  % Final   •  Absolute Neutrophils 05/18/2021 8.8* 1.8 - 7.7 K/mcL Final   • Absolute Lymphocytes 05/18/2021 2.4  1.0 - 4.8 K/mcL Final   • Absolute Monocytes 05/18/2021 1.1* 0.3 - 0.9 K/mcL Final   • Absolute Eosinophils  05/18/2021 0.0  0.0 - 0.5 K/mcL Final   • Absolute Basophils 05/18/2021 0.1  0.0 - 0.3 K/mcL Final   • Absolute Immmature Granulocytes 05/18/2021 0.0  0.0 - 0.2 K/mcL Final   • Low Molecular Weight Heparin Assay 05/18/2021 0.31  Units/mL Final       Imaging    EXAM: US [facility] LOWER EXTREMITY VENOUS DUPLEX BILATERAL     CLINICAL INDICATION: Calf pain.  Edema.     COMPARISON: No comparison was made.     TECHNIQUES: Grayscale, color flow Doppler and spectral waveform analysis of  the bilateral lower extremity veins was performed.     FINDINGS:     There is spontaneous and phasic flow to the deep veins of the bilateral  lower extremity. There is normal proximal and distal augmentation of the  Doppler signal.  All the visualized deep veins are compressible. The calf  veins are poorly seen.  Therefore, remote possibility of an isolated small  calf vein thrombus cannot be entirely excluded.     IMPRESSION:     There is no evidence of deep venous thrombosis in the bilateral lower  extremity adequately interrogated veins, although the calf veins are poorly  seen.     Electronically Signed by: NICK PARK M.D.   Signed on: 5/18/2021 11:25 AM       Results for ROBERT PALMA (MRN [id number]) as of 5/18/2021 12:14   Ref. Range 5/18/2021 09:59   HEPARIN, LOW MOLECULAR WT Latest Units: Units/mL 0.31         Assessment/Plan       1. VTE prophylaxis, obesity after laparoscopic band removal     -No personal history of VTE   -No family history of VTE  -Negative LE venous doppler today   -On  Lovenox 40 mg subQ every 12 hrs   -LMWH level therapeutic & will continue current dose of Lovenox   -Plan for 2 weeks of prophylactic anticoagulation        Saul Gonzalez MD   05/18/21 12:12 PM

## 2021-09-30 ENCOUNTER — HOSPITAL ENCOUNTER (OUTPATIENT)
Age: 51
Discharge: HOME | End: 2021-09-30
Payer: COMMERCIAL

## 2021-09-30 VITALS
WEIGHT: 214 LBS | HEART RATE: 75 BPM | SYSTOLIC BLOOD PRESSURE: 150 MMHG | DIASTOLIC BLOOD PRESSURE: 81 MMHG | OXYGEN SATURATION: 98 % | HEIGHT: 67 IN | BODY MASS INDEX: 33.59 KG/M2 | TEMPERATURE: 98 F | RESPIRATION RATE: 18 BRPM

## 2021-09-30 DIAGNOSIS — Z20.822 ENCOUNTER FOR LABORATORY TESTING FOR COVID-19 VIRUS: Primary | ICD-10-CM

## 2021-09-30 PROCEDURE — U0002 COVID-19 LAB TEST NON-CDC: HCPCS | Performed by: NURSE PRACTITIONER

## 2021-09-30 PROCEDURE — 99212 OFFICE O/P EST SF 10 MIN: CPT | Performed by: NURSE PRACTITIONER

## 2021-09-30 NOTE — ED PROVIDER NOTES
Patient Seen in: [facility]      History   Patient presents with:  Covid-19 Test    Stated Complaint: work clearance    Subjective:   HPI    45yo  Male arrives to the ic with no complaints, requesting covid testing [place] Movements: Extraocular movements intact. Pupils: Pupils are equal, round, and reactive to light. Pulmonary:      Effort: No tachypnea or respiratory distress. Musculoskeletal:         General: Normal range of motion.       Cervical back: Normal ran communication/counseling, and chart documentation but not including time spent performing procedures.           MDM   Findings consistent with request for covid test.                             Disposition and Plan     Clinical Impression:  Encounter for l

## 2022-01-13 ENCOUNTER — HOSPITAL ENCOUNTER (OUTPATIENT)
Age: 52
Discharge: HOME | End: 2022-01-13
Payer: COMMERCIAL

## 2022-01-13 DIAGNOSIS — Z20.822 ENCOUNTER FOR LABORATORY TESTING FOR COVID-19 VIRUS: Primary | ICD-10-CM

## 2022-01-16 LAB — SARS-COV-2 RNA RESP QL NAA+PROBE: DETECTED

## 2022-04-01 ENCOUNTER — OFFICE VISIT (OUTPATIENT)
Dept: FAMILY MEDICINE CLINIC | Facility: CLINIC | Age: 52
End: 2022-04-01
Payer: COMMERCIAL

## 2022-04-01 ENCOUNTER — NURSE TRIAGE (OUTPATIENT)
Dept: FAMILY MEDICINE CLINIC | Facility: CLINIC | Age: 52
End: 2022-04-01

## 2022-04-01 ENCOUNTER — LAB ENCOUNTER (OUTPATIENT)
Dept: LAB | Age: 52
End: 2022-04-01
Attending: FAMILY MEDICINE
Payer: COMMERCIAL

## 2022-04-01 ENCOUNTER — TELEPHONE (OUTPATIENT)
Dept: FAMILY MEDICINE CLINIC | Facility: CLINIC | Age: 52
End: 2022-04-01

## 2022-04-01 VITALS
HEIGHT: 67 IN | DIASTOLIC BLOOD PRESSURE: 64 MMHG | BODY MASS INDEX: 33.74 KG/M2 | SYSTOLIC BLOOD PRESSURE: 103 MMHG | WEIGHT: 215 LBS | TEMPERATURE: 99 F | HEART RATE: 89 BPM

## 2022-04-01 DIAGNOSIS — K52.9 GASTROENTERITIS: ICD-10-CM

## 2022-04-01 DIAGNOSIS — K52.9 GASTROENTERITIS: Primary | ICD-10-CM

## 2022-04-01 LAB
ANION GAP SERPL CALC-SCNC: 6 MMOL/L (ref 0–18)
BUN BLD-MCNC: 19 MG/DL (ref 7–18)
BUN/CREAT SERPL: 16.5 (ref 10–20)
CALCIUM BLD-MCNC: 8.4 MG/DL (ref 8.5–10.1)
CHLORIDE SERPL-SCNC: 103 MMOL/L (ref 98–112)
CO2 SERPL-SCNC: 27 MMOL/L (ref 21–32)
CREAT BLD-MCNC: 1.15 MG/DL
EST. AVERAGE GLUCOSE BLD GHB EST-MCNC: 269 MG/DL (ref 68–126)
FASTING STATUS PATIENT QL REPORTED: YES
GLUCOSE BLD-MCNC: 228 MG/DL (ref 70–99)
HBA1C MFR BLD: 11 % (ref ?–5.7)
OSMOLALITY SERPL CALC.SUM OF ELEC: 291 MOSM/KG (ref 275–295)
POTASSIUM SERPL-SCNC: 4.3 MMOL/L (ref 3.5–5.1)
SODIUM SERPL-SCNC: 136 MMOL/L (ref 136–145)

## 2022-04-01 PROCEDURE — 3008F BODY MASS INDEX DOCD: CPT | Performed by: FAMILY MEDICINE

## 2022-04-01 PROCEDURE — 99213 OFFICE O/P EST LOW 20 MIN: CPT | Performed by: FAMILY MEDICINE

## 2022-04-01 PROCEDURE — 80048 BASIC METABOLIC PNL TOTAL CA: CPT

## 2022-04-01 PROCEDURE — 3074F SYST BP LT 130 MM HG: CPT | Performed by: FAMILY MEDICINE

## 2022-04-01 PROCEDURE — 36415 COLL VENOUS BLD VENIPUNCTURE: CPT

## 2022-04-01 PROCEDURE — 3078F DIAST BP <80 MM HG: CPT | Performed by: FAMILY MEDICINE

## 2022-04-01 PROCEDURE — 83036 HEMOGLOBIN GLYCOSYLATED A1C: CPT

## 2022-04-01 RX ORDER — BLOOD SUGAR DIAGNOSTIC
1 STRIP MISCELLANEOUS DAILY
COMMUNITY
Start: 2020-06-25

## 2022-04-01 NOTE — TELEPHONE ENCOUNTER
Patient calling to confirm +Covid results from  visit 1/13/22, confirmed results were positive for Covid at that time.  Patient requesting a copy of his results, mailed per request.

## 2023-01-12 ENCOUNTER — OFFICE VISIT (OUTPATIENT)
Dept: FAMILY MEDICINE CLINIC | Facility: CLINIC | Age: 53
End: 2023-01-12

## 2023-01-12 VITALS
DIASTOLIC BLOOD PRESSURE: 76 MMHG | HEART RATE: 70 BPM | SYSTOLIC BLOOD PRESSURE: 137 MMHG | WEIGHT: 218.19 LBS | HEIGHT: 67 IN | BODY MASS INDEX: 34.25 KG/M2

## 2023-01-12 DIAGNOSIS — Z00.00 PHYSICAL EXAM: Primary | ICD-10-CM

## 2023-01-12 DIAGNOSIS — E11.42 TYPE 2 DIABETES MELLITUS WITH DIABETIC POLYNEUROPATHY, WITHOUT LONG-TERM CURRENT USE OF INSULIN (HCC): ICD-10-CM

## 2023-01-12 DIAGNOSIS — Z12.11 COLON CANCER SCREENING: ICD-10-CM

## 2023-01-12 PROCEDURE — 99213 OFFICE O/P EST LOW 20 MIN: CPT | Performed by: FAMILY MEDICINE

## 2023-01-12 PROCEDURE — 90471 IMMUNIZATION ADMIN: CPT | Performed by: FAMILY MEDICINE

## 2023-01-12 PROCEDURE — 3008F BODY MASS INDEX DOCD: CPT | Performed by: FAMILY MEDICINE

## 2023-01-12 PROCEDURE — 3075F SYST BP GE 130 - 139MM HG: CPT | Performed by: FAMILY MEDICINE

## 2023-01-12 PROCEDURE — 90715 TDAP VACCINE 7 YRS/> IM: CPT | Performed by: FAMILY MEDICINE

## 2023-01-12 PROCEDURE — 99396 PREV VISIT EST AGE 40-64: CPT | Performed by: FAMILY MEDICINE

## 2023-01-12 PROCEDURE — 3078F DIAST BP <80 MM HG: CPT | Performed by: FAMILY MEDICINE

## 2023-01-12 RX ORDER — INSULIN GLARGINE 100 [IU]/ML
30 INJECTION, SOLUTION SUBCUTANEOUS NIGHTLY
Qty: 5 EACH | Refills: 2 | Status: SHIPPED | OUTPATIENT
Start: 2023-01-12

## 2023-01-12 RX ORDER — SEMAGLUTIDE 1.34 MG/ML
0.5 INJECTION, SOLUTION SUBCUTANEOUS WEEKLY
Qty: 4 EACH | Refills: 3 | Status: SHIPPED | OUTPATIENT
Start: 2023-01-12

## 2023-01-12 RX ORDER — EMPAGLIFLOZIN AND LINAGLIPTIN 25; 5 MG/1; MG/1
1 TABLET, FILM COATED ORAL DAILY
Qty: 90 TABLET | Refills: 1 | Status: SHIPPED | OUTPATIENT
Start: 2023-01-12

## 2023-01-12 RX ORDER — LOSARTAN POTASSIUM 25 MG/1
25 TABLET ORAL DAILY
Qty: 90 TABLET | Refills: 1 | Status: SHIPPED | OUTPATIENT
Start: 2023-01-12

## 2023-01-13 ENCOUNTER — EKG ENCOUNTER (OUTPATIENT)
Dept: LAB | Age: 53
End: 2023-01-13
Attending: FAMILY MEDICINE
Payer: COMMERCIAL

## 2023-01-13 ENCOUNTER — LAB ENCOUNTER (OUTPATIENT)
Dept: LAB | Age: 53
End: 2023-01-13
Attending: FAMILY MEDICINE
Payer: COMMERCIAL

## 2023-01-13 DIAGNOSIS — E11.42 TYPE 2 DIABETES MELLITUS WITH DIABETIC POLYNEUROPATHY, WITHOUT LONG-TERM CURRENT USE OF INSULIN (HCC): ICD-10-CM

## 2023-01-13 DIAGNOSIS — Z00.00 PHYSICAL EXAM: ICD-10-CM

## 2023-01-13 LAB
ALBUMIN SERPL-MCNC: 3.8 G/DL (ref 3.4–5)
ALBUMIN/GLOB SERPL: 1.2 {RATIO} (ref 1–2)
ALP LIVER SERPL-CCNC: 103 U/L
ALT SERPL-CCNC: 69 U/L
ANION GAP SERPL CALC-SCNC: 2 MMOL/L (ref 0–18)
AST SERPL-CCNC: 28 U/L (ref 15–37)
ATRIAL RATE: 65 BPM
BASOPHILS # BLD AUTO: 0.04 X10(3) UL (ref 0–0.2)
BASOPHILS NFR BLD AUTO: 0.6 %
BILIRUB SERPL-MCNC: 0.8 MG/DL (ref 0.1–2)
BILIRUB UR QL: NEGATIVE
BUN BLD-MCNC: 13 MG/DL (ref 7–18)
BUN/CREAT SERPL: 15.9 (ref 10–20)
CALCIUM BLD-MCNC: 9 MG/DL (ref 8.5–10.1)
CHLORIDE SERPL-SCNC: 104 MMOL/L (ref 98–112)
CHOLEST SERPL-MCNC: 157 MG/DL (ref ?–200)
CLARITY UR: CLEAR
CO2 SERPL-SCNC: 29 MMOL/L (ref 21–32)
COLOR UR: YELLOW
CREAT BLD-MCNC: 0.82 MG/DL
CREAT UR-SCNC: 91.9 MG/DL
DEPRECATED RDW RBC AUTO: 36.9 FL (ref 35.1–46.3)
EOSINOPHIL # BLD AUTO: 0.1 X10(3) UL (ref 0–0.7)
EOSINOPHIL NFR BLD AUTO: 1.5 %
ERYTHROCYTE [DISTWIDTH] IN BLOOD BY AUTOMATED COUNT: 12.3 % (ref 11–15)
EST. AVERAGE GLUCOSE BLD GHB EST-MCNC: 292 MG/DL (ref 68–126)
FASTING PATIENT LIPID ANSWER: YES
FASTING STATUS PATIENT QL REPORTED: YES
GFR SERPLBLD BASED ON 1.73 SQ M-ARVRAT: 104 ML/MIN/1.73M2 (ref 60–?)
GLOBULIN PLAS-MCNC: 3.2 G/DL (ref 2.8–4.4)
GLUCOSE BLD-MCNC: 270 MG/DL (ref 70–99)
GLUCOSE UR-MCNC: 500 MG/DL
HBA1C MFR BLD: 11.8 % (ref ?–5.7)
HCT VFR BLD AUTO: 45.8 %
HDLC SERPL-MCNC: 30 MG/DL (ref 40–59)
HGB BLD-MCNC: 15 G/DL
HGB UR QL STRIP.AUTO: NEGATIVE
IMM GRANULOCYTES # BLD AUTO: 0.02 X10(3) UL (ref 0–1)
IMM GRANULOCYTES NFR BLD: 0.3 %
KETONES UR-MCNC: NEGATIVE MG/DL
LDLC SERPL CALC-MCNC: 87 MG/DL (ref ?–100)
LEUKOCYTE ESTERASE UR QL STRIP.AUTO: NEGATIVE
LYMPHOCYTES # BLD AUTO: 1.95 X10(3) UL (ref 1–4)
LYMPHOCYTES NFR BLD AUTO: 29.5 %
MCH RBC QN AUTO: 27.5 PG (ref 26–34)
MCHC RBC AUTO-ENTMCNC: 32.8 G/DL (ref 31–37)
MCV RBC AUTO: 84 FL
MICROALBUMIN UR-MCNC: <0.5 MG/DL
MONOCYTES # BLD AUTO: 0.53 X10(3) UL (ref 0.1–1)
MONOCYTES NFR BLD AUTO: 8 %
NEUTROPHILS # BLD AUTO: 3.96 X10 (3) UL (ref 1.5–7.7)
NEUTROPHILS # BLD AUTO: 3.96 X10(3) UL (ref 1.5–7.7)
NEUTROPHILS NFR BLD AUTO: 60.1 %
NITRITE UR QL STRIP.AUTO: NEGATIVE
NONHDLC SERPL-MCNC: 127 MG/DL (ref ?–130)
OSMOLALITY SERPL CALC.SUM OF ELEC: 290 MOSM/KG (ref 275–295)
P AXIS: 28 DEGREES
P-R INTERVAL: 196 MS
PH UR: 5.5 [PH] (ref 5–8)
PLATELET # BLD AUTO: 89 10(3)UL (ref 150–450)
POTASSIUM SERPL-SCNC: 4 MMOL/L (ref 3.5–5.1)
PROT SERPL-MCNC: 7 G/DL (ref 6.4–8.2)
PROT UR-MCNC: NEGATIVE MG/DL
PSA SERPL-MCNC: 0.15 NG/ML (ref ?–4)
Q-T INTERVAL: 420 MS
QRS DURATION: 96 MS
QTC CALCULATION (BEZET): 436 MS
R AXIS: -10 DEGREES
RBC # BLD AUTO: 5.45 X10(6)UL
SODIUM SERPL-SCNC: 135 MMOL/L (ref 136–145)
SP GR UR STRIP: 1.02 (ref 1–1.03)
T AXIS: 9 DEGREES
TRIGL SERPL-MCNC: 239 MG/DL (ref 30–149)
TSI SER-ACNC: 1.56 MIU/ML (ref 0.36–3.74)
UROBILINOGEN UR STRIP-ACNC: 0.2
VENTRICULAR RATE: 65 BPM
VIT D+METAB SERPL-MCNC: 14.6 NG/ML (ref 30–100)
VLDLC SERPL CALC-MCNC: 39 MG/DL (ref 0–30)
WBC # BLD AUTO: 6.6 X10(3) UL (ref 4–11)

## 2023-01-13 PROCEDURE — 80061 LIPID PANEL: CPT

## 2023-01-13 PROCEDURE — 82570 ASSAY OF URINE CREATININE: CPT

## 2023-01-13 PROCEDURE — 84443 ASSAY THYROID STIM HORMONE: CPT

## 2023-01-13 PROCEDURE — 84153 ASSAY OF PSA TOTAL: CPT

## 2023-01-13 PROCEDURE — 83036 HEMOGLOBIN GLYCOSYLATED A1C: CPT

## 2023-01-13 PROCEDURE — 3061F NEG MICROALBUMINURIA REV: CPT | Performed by: FAMILY MEDICINE

## 2023-01-13 PROCEDURE — 93010 ELECTROCARDIOGRAM REPORT: CPT | Performed by: INTERNAL MEDICINE

## 2023-01-13 PROCEDURE — 36415 COLL VENOUS BLD VENIPUNCTURE: CPT

## 2023-01-13 PROCEDURE — 82043 UR ALBUMIN QUANTITATIVE: CPT

## 2023-01-13 PROCEDURE — 3046F HEMOGLOBIN A1C LEVEL >9.0%: CPT | Performed by: FAMILY MEDICINE

## 2023-01-13 PROCEDURE — 82306 VITAMIN D 25 HYDROXY: CPT

## 2023-01-13 PROCEDURE — 93005 ELECTROCARDIOGRAM TRACING: CPT

## 2023-01-13 PROCEDURE — 81003 URINALYSIS AUTO W/O SCOPE: CPT

## 2023-01-13 PROCEDURE — 80053 COMPREHEN METABOLIC PANEL: CPT

## 2023-01-13 PROCEDURE — 85025 COMPLETE CBC W/AUTO DIFF WBC: CPT

## 2023-01-13 RX ORDER — ERGOCALCIFEROL 1.25 MG/1
50000 CAPSULE ORAL WEEKLY
Qty: 12 CAPSULE | Refills: 4 | Status: SHIPPED | OUTPATIENT
Start: 2023-01-13 | End: 2023-02-12

## 2023-01-24 ENCOUNTER — TELEPHONE (OUTPATIENT)
Dept: FAMILY MEDICINE CLINIC | Facility: CLINIC | Age: 53
End: 2023-01-24

## 2023-01-24 RX ORDER — EMPAGLIFLOZIN AND LINAGLIPTIN 25; 5 MG/1; MG/1
1 TABLET, FILM COATED ORAL DAILY
Qty: 90 TABLET | Refills: 1 | Status: CANCELLED | OUTPATIENT
Start: 2023-01-24

## 2023-01-24 RX ORDER — SEMAGLUTIDE 1.34 MG/ML
0.5 INJECTION, SOLUTION SUBCUTANEOUS WEEKLY
Qty: 4 EACH | Refills: 3 | Status: CANCELLED | OUTPATIENT
Start: 2023-01-24

## 2023-01-24 RX ORDER — GLIMEPIRIDE 2 MG/1
2 TABLET ORAL
Qty: 90 TABLET | Refills: 2 | Status: CANCELLED | OUTPATIENT
Start: 2023-01-24

## 2023-01-24 NOTE — TELEPHONE ENCOUNTER
Patient called stating he needs diabetes medication resent to pharmacy. Patient state he was unable to pick them up due [place]. Patient needs them sent as soon as possible because [place] is going [place] the country tomorrow.     [address] [place] [address] JOSE Choudhury

## 2023-01-24 NOTE — TELEPHONE ENCOUNTER
meds sent 1/12-1/13, pt to contact pharmacy or fulfillment, Phoned pharmacy for clarification     Dr Alix Orozco send Alternative for insulin   Semglee(YFGN)     Ozempic needs PA    Prior authorization initiated for Ozempic,await 1-5 days for decision

## 2023-01-25 RX ORDER — INSULIN GLARGINE 100 [IU]/ML
30 INJECTION, SOLUTION SUBCUTANEOUS NIGHTLY
Qty: 5 EACH | Refills: 3 | Status: SHIPPED | OUTPATIENT
Start: 2023-01-25

## 2023-01-30 NOTE — TELEPHONE ENCOUNTER
Diego Bjorn [place]  [phone]    NPI : [id number]    Phoned primed for an update, advised PA was withdrawn due to non sufficient information (dx), advised dx was documented on PA form . Was advised to resubmit.      Prior authorization re- initiated for Ozempic, await 1-5 days for decision

## 2023-01-31 ENCOUNTER — MED REC SCAN ONLY (OUTPATIENT)
Dept: FAMILY MEDICINE CLINIC | Facility: CLINIC | Age: 53
End: 2023-01-31

## 2023-08-28 NOTE — TELEPHONE ENCOUNTER
No response letter was mailed to patient [facility]    2023 2:58 PM    Assessment/Plan:    Patient presents today with supratherapeutic INR.    Assessment of patient findings and chart review: INR not at goal. Medications, chart, and patient findings reviewed. See calendar for adjustments to dose and follow up plan.  Patient reports being on a liquid diet for the last week due to dental issues. Lack of his usual vitamin K in his diet may be causing the increased INR. In addition, he was overdue for his INR. Patient reports he needs one tooth extracted. We will need to be notified of the date of this procedure once it is scheduled. We do not usually hold coumadin for one tooth extraction but since INR is extremely elevated today, I do not recommend scheduling this extraction until we can get the INR back into therapeutic range. I advise he hold coumadin x 2 days and we will repeat INR. He can drink a green smoothie to help INR move down    Recommendation for patient's warfarin regimen: Hold dose for 2 days    Recommend repeat INR in 3 days  _________________________________________________________________    Yong Brandon Mcintyre (45 y.o.) is followed by the [facility].    Anticoagulation Summary  As of 2023      INR goal:  2.0-3.0   TTR:  54.2 % (10.8 y)   INR used for dosin.9 (2023)   Warfarin maintenance plan:  10 mg (5 mg x 2) every Sat; 5 mg (5 mg x 1) all other days   Weekly warfarin total:  40 mg   Plan last modified:  Pierce Bush, PharmD (2023)   Next INR check:  2023   Target end date:  Indefinite    Indications    Chronic pulmonary heart disease  unspecified [I27.9]  Long term current use of anticoagulant therapy [Z79.01]                 Anticoagulation Episode Summary       INR check location:  Clinic Lab    Preferred lab:  [facility]    Send INR reminders to:  [facility] COUMADIN MONITORING POOL     Comments:  [facility]//Infusion Suite, every other Mon --  [phone]// <VENIPUNCTURE ONLY (POC/meter does not work for pt)>          Anticoagulation Care Providers       Provider Role Specialty Phone number    Gianni Escalona MD [facility] [phone]            Patient Findings       Positives:  Change in diet/appetite    Negatives:  Signs/symptoms of thrombosis, Signs/symptoms of bleeding, Laboratory test error suspected, Change in health, Change in alcohol use, Change in activity, Upcoming invasive procedure, Emergency department visit, Upcoming dental procedure, Missed doses, Extra doses, Change in medications, Hospital admission, Bruising, Other complaints    Comments:  Patient questioned regarding elevated INR. Proper warfarin dosing confirmed. Patient reports a liquid diet for the past week due to dental issues. Patient denied any other changes in medications, diet, and health. Patient also denied s/sx of bleeding. Patient advised to visit ED in the event of s/sx of bleeding.

## 2023-10-24 ENCOUNTER — HOSPITAL ENCOUNTER (EMERGENCY)
Age: 53
Discharge: HOME | End: 2023-10-24
Attending: EMERGENCY MEDICINE

## 2023-10-24 VITALS
BODY MASS INDEX: 33.84 KG/M2 | WEIGHT: 210.54 LBS | RESPIRATION RATE: 17 BRPM | TEMPERATURE: 98.2 F | SYSTOLIC BLOOD PRESSURE: 123 MMHG | DIASTOLIC BLOOD PRESSURE: 67 MMHG | HEART RATE: 82 BPM | HEIGHT: 66 IN | OXYGEN SATURATION: 97 %

## 2023-10-24 DIAGNOSIS — L50.8 ACUTE URTICARIA: Primary | ICD-10-CM

## 2023-10-24 PROCEDURE — 96375 TX/PRO/DX INJ NEW DRUG ADDON: CPT

## 2023-10-24 PROCEDURE — 96374 THER/PROPH/DIAG INJ IV PUSH: CPT

## 2023-10-24 PROCEDURE — 10002801 HB RX 250 W/O HCPCS: Performed by: EMERGENCY MEDICINE

## 2023-10-24 PROCEDURE — 99282 EMERGENCY DEPT VISIT SF MDM: CPT

## 2023-10-24 PROCEDURE — 10002800 HB RX 250 W HCPCS: Performed by: EMERGENCY MEDICINE

## 2023-10-24 RX ORDER — EPINEPHRINE 0.3 MG/.3ML
0.3 INJECTION SUBCUTANEOUS
Qty: 1 EACH | Refills: 0 | OUTPATIENT
Start: 2023-10-24 | End: 2023-10-24

## 2023-10-24 RX ORDER — PREDNISONE 20 MG/1
40 TABLET ORAL DAILY
Qty: 8 TABLET | Refills: 0 | OUTPATIENT
Start: 2023-10-24 | End: 2023-10-24

## 2023-10-24 RX ORDER — EPINEPHRINE 0.3 MG/.3ML
0.3 INJECTION SUBCUTANEOUS
Qty: 1 EACH | Refills: 0 | Status: SHIPPED | OUTPATIENT
Start: 2023-10-24

## 2023-10-24 RX ORDER — FAMOTIDINE 10 MG/ML
20 INJECTION, SOLUTION INTRAVENOUS ONCE
Status: COMPLETED | OUTPATIENT
Start: 2023-10-24 | End: 2023-10-24

## 2023-10-24 RX ORDER — EPINEPHRINE 0.3 MG/.3ML
0.3 INJECTION SUBCUTANEOUS
Qty: 1 EACH | Refills: 0 | Status: SHIPPED | OUTPATIENT
Start: 2023-10-24 | End: 2023-10-24

## 2023-10-24 RX ORDER — DIPHENHYDRAMINE HCL 25 MG
25 TABLET ORAL EVERY 6 HOURS PRN
Qty: 20 TABLET | Refills: 0 | OUTPATIENT
Start: 2023-10-24 | End: 2023-10-24

## 2023-10-24 RX ORDER — DIPHENHYDRAMINE HCL 25 MG
25 TABLET ORAL EVERY 6 HOURS PRN
Qty: 20 TABLET | Refills: 0 | Status: SHIPPED | OUTPATIENT
Start: 2023-10-24 | End: 2023-10-24

## 2023-10-24 RX ORDER — PREDNISONE 20 MG/1
40 TABLET ORAL DAILY
Qty: 8 TABLET | Refills: 0 | Status: SHIPPED | OUTPATIENT
Start: 2023-10-24 | End: 2023-10-28

## 2023-10-24 RX ORDER — DIPHENHYDRAMINE HCL 25 MG
25 TABLET ORAL EVERY 6 HOURS PRN
Qty: 20 TABLET | Refills: 0 | Status: SHIPPED | OUTPATIENT
Start: 2023-10-24 | End: 2023-10-29

## 2023-10-24 RX ORDER — PREDNISONE 20 MG/1
40 TABLET ORAL DAILY
Qty: 8 TABLET | Refills: 0 | Status: SHIPPED | OUTPATIENT
Start: 2023-10-24 | End: 2023-10-24

## 2023-10-24 RX ORDER — METHYLPREDNISOLONE SODIUM SUCCINATE 125 MG/2ML
60 INJECTION, POWDER, LYOPHILIZED, FOR SOLUTION INTRAMUSCULAR; INTRAVENOUS ONCE
Status: COMPLETED | OUTPATIENT
Start: 2023-10-24 | End: 2023-10-24

## 2023-10-24 RX ADMIN — METHYLPREDNISOLONE SODIUM SUCCINATE 60 MG: 125 INJECTION, POWDER, FOR SOLUTION INTRAMUSCULAR; INTRAVENOUS at 01:21

## 2023-10-24 RX ADMIN — FAMOTIDINE 20 MG: 10 INJECTION INTRAVENOUS at 01:25

## 2023-10-24 ASSESSMENT — PAIN SCALES - GENERAL
PAINLEVEL_OUTOF10: 0
PAINLEVEL_OUTOF10: 0

## 2023-10-26 ENCOUNTER — HOSPITAL ENCOUNTER (EMERGENCY)
Facility: HOSPITAL | Age: 53
Discharge: HOME | End: 2023-10-27
Attending: EMERGENCY MEDICINE

## 2023-10-26 VITALS
HEART RATE: 79 BPM | DIASTOLIC BLOOD PRESSURE: 79 MMHG | SYSTOLIC BLOOD PRESSURE: 136 MMHG | OXYGEN SATURATION: 97 % | TEMPERATURE: 98 F | HEIGHT: 68 IN | BODY MASS INDEX: 31.52 KG/M2 | WEIGHT: 208 LBS | RESPIRATION RATE: 20 BRPM

## 2023-10-26 DIAGNOSIS — T78.40XA ALLERGIC REACTION, INITIAL ENCOUNTER: Primary | ICD-10-CM

## 2023-10-26 DIAGNOSIS — L50.0 ALLERGIC URTICARIA: ICD-10-CM

## 2023-10-26 PROCEDURE — 96375 TX/PRO/DX INJ NEW DRUG ADDON: CPT

## 2023-10-26 PROCEDURE — 99284 EMERGENCY DEPT VISIT MOD MDM: CPT

## 2023-10-26 PROCEDURE — S0028 INJECTION, FAMOTIDINE, 20 MG: HCPCS | Performed by: EMERGENCY MEDICINE

## 2023-10-26 PROCEDURE — 96374 THER/PROPH/DIAG INJ IV PUSH: CPT

## 2023-10-26 RX ORDER — FAMOTIDINE 10 MG/ML
20 INJECTION, SOLUTION INTRAVENOUS ONCE
Status: COMPLETED | OUTPATIENT
Start: 2023-10-26 | End: 2023-10-26

## 2023-10-26 RX ORDER — PREDNISONE 20 MG/1
TABLET ORAL
Qty: 17 TABLET | Refills: 0 | Status: SHIPPED | OUTPATIENT
Start: 2023-10-27 | End: 2023-11-06

## 2023-10-26 RX ORDER — DIPHENHYDRAMINE HYDROCHLORIDE 50 MG/ML
50 INJECTION INTRAMUSCULAR; INTRAVENOUS ONCE
Status: COMPLETED | OUTPATIENT
Start: 2023-10-26 | End: 2023-10-26

## 2023-10-26 RX ORDER — METHYLPREDNISOLONE SODIUM SUCCINATE 125 MG/2ML
125 INJECTION, POWDER, LYOPHILIZED, FOR SOLUTION INTRAMUSCULAR; INTRAVENOUS ONCE
Status: COMPLETED | OUTPATIENT
Start: 2023-10-26 | End: 2023-10-26

## 2023-10-27 ENCOUNTER — TELEPHONE (OUTPATIENT)
Dept: FAMILY MEDICINE CLINIC | Facility: CLINIC | Age: 53
End: 2023-10-27

## 2023-10-27 NOTE — TELEPHONE ENCOUNTER
Patient called and made an appointment for en ER follow up for Monday. He is asking if he should get an order of lab work because he went to the ER for an allergic reaction. Please advise. Is Yi speaking patient.

## 2023-10-27 NOTE — DISCHARGE INSTRUCTIONS
Increase her steroids to 60 mg for 2 days then 40 mg for 3 days then 20 mg for 5 days. Increase your Benadryl to 50 mg every 6-8 hours. Take the Pepcid as prescribed as well.

## 2023-10-27 NOTE — ED INITIAL ASSESSMENT (HPI)
Patient arrived from home, c/c rash/hives to upper chest and bilateral arms/legs ongoing for 1 week, seen at [facility] and given medication then, itchiness and redness worse and spreading all over body.

## 2023-10-28 NOTE — TELEPHONE ENCOUNTER
Pt was called and he is feeling better pt was inform to keep his appt for Monday  will do his assessment and if needed he will order blood test for him. Pt verbalized understanding.       Future Appointments   Date Time Provider [place]le   10/30/2023 10:00 AM Porsha Murray DO [facility] [place]

## 2023-10-31 ENCOUNTER — TELEPHONE (OUTPATIENT)
Dept: FAMILY MEDICINE CLINIC | Facility: CLINIC | Age: 53
End: 2023-10-31

## 2023-10-31 DIAGNOSIS — R21 RASH: Primary | ICD-10-CM

## 2023-10-31 DIAGNOSIS — Z91.010 PEANUT ALLERGY: ICD-10-CM

## 2023-10-31 NOTE — TELEPHONE ENCOUNTER
Patient called and is asking what he can do for his allergies, he was seen at the ER a couple of weeks ago was given medicine but is not working. He wants to make an appointment with an allergist but he is booked out till January. Please advise patient said he has not gone to work due to his allergies. Patient had a follow up appointment on 10/30 but was cancelled.

## 2023-11-01 ENCOUNTER — TELEPHONE (OUTPATIENT)
Dept: FAMILY MEDICINE CLINIC | Facility: CLINIC | Age: 53
End: 2023-11-01

## 2023-11-01 NOTE — TELEPHONE ENCOUNTER
Pt was called and informed of  message below. Pt stated that he needs a appt ASAP as he has been off work and he needs to know what is causing all this.     Future Appointments   Date Time Provider Pj Calle   11/2/2023  1:15 PM [facility]uel[facility]

## 2023-11-01 NOTE — TELEPHONE ENCOUNTER
With [employer]  Patricia Carreno   ID # [id number]    Patient calling ( identified name and  )  states has a rash  ( was in ER on 10/26 ) canceled f/u  appointment on 10/30,  was not able  to come in      ( Works in a [employer] ,unsure if has allergy to work situation )    States rash is better since he is using the Prednisone , rash seems to be intermittent to various body parts     Patient requesting referral for allergist     Referral pended for your review, completion and approval.        Patient advised we may be calling back with an  . The number will not be listed as our office, so please answer the call.   Patient agrees to answer the call     Best call back number: Deborah Verito at [phone]

## 2023-11-01 NOTE — TELEPHONE ENCOUNTER
Patient is requesting referral.     Name of specialist and specialty department : referral to allergy, no specific doctor  Reason for visit with the specialist: rash all over body  Address of the specialist office: none   Appointment date: none         CSS informed patient the turnaround time for referral is 5-7 business days. Patient was informed to check their [employer] account for referral status.

## 2023-11-02 ENCOUNTER — OFFICE VISIT (OUTPATIENT)
Dept: FAMILY MEDICINE CLINIC | Facility: CLINIC | Age: 53
End: 2023-11-02

## 2023-11-02 ENCOUNTER — LAB ENCOUNTER (OUTPATIENT)
Dept: LAB | Age: 53
End: 2023-11-02
Attending: FAMILY MEDICINE
Payer: COMMERCIAL

## 2023-11-02 VITALS
HEART RATE: 94 BPM | SYSTOLIC BLOOD PRESSURE: 132 MMHG | DIASTOLIC BLOOD PRESSURE: 81 MMHG | WEIGHT: 214.81 LBS | BODY MASS INDEX: 33 KG/M2

## 2023-11-02 DIAGNOSIS — Z91.018 FOOD ALLERGY: Primary | ICD-10-CM

## 2023-11-02 DIAGNOSIS — L50.9 URTICARIA: ICD-10-CM

## 2023-11-02 DIAGNOSIS — Z91.018 FOOD ALLERGY: ICD-10-CM

## 2023-11-02 DIAGNOSIS — E11.65 TYPE 2 DIABETES MELLITUS WITH HYPERGLYCEMIA, WITH LONG-TERM CURRENT USE OF INSULIN (HCC): ICD-10-CM

## 2023-11-02 DIAGNOSIS — Z79.4 TYPE 2 DIABETES MELLITUS WITH HYPERGLYCEMIA, WITH LONG-TERM CURRENT USE OF INSULIN (HCC): ICD-10-CM

## 2023-11-02 PROCEDURE — 3079F DIAST BP 80-89 MM HG: CPT | Performed by: FAMILY MEDICINE

## 2023-11-02 PROCEDURE — 86003 ALLG SPEC IGE CRUDE XTRC EA: CPT

## 2023-11-02 PROCEDURE — 82785 ASSAY OF IGE: CPT

## 2023-11-02 PROCEDURE — 99214 OFFICE O/P EST MOD 30 MIN: CPT | Performed by: FAMILY MEDICINE

## 2023-11-02 PROCEDURE — 36415 COLL VENOUS BLD VENIPUNCTURE: CPT

## 2023-11-02 PROCEDURE — 3075F SYST BP GE 130 - 139MM HG: CPT | Performed by: FAMILY MEDICINE

## 2023-11-02 RX ORDER — EMPAGLIFLOZIN AND LINAGLIPTIN 25; 5 MG/1; MG/1
1 TABLET, FILM COATED ORAL DAILY
Qty: 90 TABLET | Refills: 0 | Status: SHIPPED | OUTPATIENT
Start: 2023-11-02

## 2023-11-02 RX ORDER — LEVOCETIRIZINE DIHYDROCHLORIDE 5 MG/1
5 TABLET, FILM COATED ORAL EVERY EVENING
Qty: 30 TABLET | Refills: 0 | Status: SHIPPED | OUTPATIENT
Start: 2023-11-02

## 2023-11-02 RX ORDER — GLIMEPIRIDE 2 MG/1
2 TABLET ORAL
Qty: 90 TABLET | Refills: 0 | Status: SHIPPED | OUTPATIENT
Start: 2023-11-02

## 2023-11-02 NOTE — TELEPHONE ENCOUNTER
Current Outpatient Medications:     levocetirizine 5 MG Oral Tab, Take 1 tablet (5 mg total) by mouth every evening., Disp: 30 tablet, Rfl: 0    ORIGINAL QTY 30  QTY REQUESTED 90

## 2023-11-02 NOTE — TELEPHONE ENCOUNTER
Patient called and said he needs refill on medication below, patient just saw Thom Jameson today.      Medication Quantity Refills Start End   losartan 25 MG Oral Tab

## 2023-11-03 RX ORDER — LEVOCETIRIZINE DIHYDROCHLORIDE 5 MG/1
5 TABLET, FILM COATED ORAL EVERY EVENING
Qty: 90 TABLET | Refills: 0 | OUTPATIENT
Start: 2023-11-03

## 2023-11-03 NOTE — TELEPHONE ENCOUNTER
Please review; protocol failed. No active /future labs noted     Requested Prescriptions   Pending Prescriptions Disp Refills    losartan 25 MG Oral Tab 90 tablet 1     Sig: Take 1 tablet (25 mg total) by mouth daily. Hypertensive Medications Protocol Failed - 11/3/2023  2:29 PM        Failed - CMP or BMP in past 6 months     No results found for this or any previous visit (from the past 4392 hour(s)).             Passed - In person appointment in the past 12 or next 3 months     Recent Outpatient Visits              Yesterday Food allergy    [address] [facility], Annamaria Clifton MD    Office Visit    9 months ago Physical exam    [address], [address], Frances Mcardle, MD    Office Visit    1 year ago [address], [place], Roselinda Sayer, DO    Office Visit    2 years ago Shanell Reza Media Reef, MD    Office Visit    2 years ago Scrotal pain    [facility], [address], Annamaria Clifton MD    Office Visit                      Passed - Last BP reading less than 140/90     BP Readings from Last 1 Encounters:  11/02/23 : 132/81              Passed - In person appointment or virtual visit in the past 6 months     Recent Outpatient Visits              Yesterday Food allergy    [address] [facility], Annamaria Clifton MD    Office Visit    9 months ago Physical exam    [address] [facility], Frances Mcardle, MD    Office Visit    1 year ago [address] [place] [address], [address] [place] [address], [place] Ronnie Olmos, DO    Office Visit    2 years ago Shanell Reza Media Reef, MD    Office Visit    2 years ago [facility]rotal pain    [address] [facility]    Office Visit                      [facility] or [facility] > 50     GFR Evaluation  EGFRCR: 104 , resulted on 1/13/2023             Recent Outpatient Visits              Yesterday Food allergy    Andrea Herring MD    Office Visit    9 months ago Physical exam    [address] [facility], Gemma Aguilar MD    Office Visit    1 year ago [address] [place] [address], [place], Stephani Shultz DO    Office Visit    2 years ago Zoya Collazo [place]am MD    Office Visit    2 years ago Scrotal pain    [address] [facility], Paulina Rodriguez MD    Office Visit

## 2023-11-04 RX ORDER — LOSARTAN POTASSIUM 25 MG/1
25 TABLET ORAL DAILY
Qty: 10 TABLET | Refills: 0 | Status: SHIPPED | OUTPATIENT
Start: 2023-11-04

## 2023-11-07 LAB
CLAM IGE QN: <0.1 KUA/L (ref ?–0.1)
CODFISH IGE QN: <0.1 KUA/L (ref ?–0.1)
CORN IGE QN: <0.1 KUA/L (ref ?–0.1)
COW MILK IGE QN: <0.1 KUA/L (ref ?–0.1)
EGG WHITE IGE QN: <0.1 KUA/L (ref ?–0.1)
IGE SERPL-ACNC: 17.2 KU/L (ref 2–214)
PEANUT IGE QN: <0.1 KUA/L (ref ?–0.1)
SCALLOP IGE QN: <0.1 KUA/L (ref ?–0.1)
SESAME SEED IGE QN: <0.1 KUA/L (ref ?–0.1)
SHRIMP IGE QN: <0.1 KUA/L (ref ?–0.1)
SOYBEAN IGE QN: <0.1 KUA/L (ref ?–0.1)
WALNUT IGE QN: <0.1 KUA/L (ref ?–0.1)
WHEAT IGE QN: <0.1 KUA/L (ref ?–0.1)

## 2023-11-09 ENCOUNTER — MED REC SCAN ONLY (OUTPATIENT)
Dept: FAMILY MEDICINE CLINIC | Facility: CLINIC | Age: 53
End: 2023-11-09

## 2023-11-17 ENCOUNTER — HOSPITAL ENCOUNTER (OUTPATIENT)
Age: 53
Discharge: HOME | End: 2023-11-17
Payer: COMMERCIAL

## 2023-11-17 VITALS
RESPIRATION RATE: 14 BRPM | SYSTOLIC BLOOD PRESSURE: 139 MMHG | OXYGEN SATURATION: 95 % | HEART RATE: 84 BPM | DIASTOLIC BLOOD PRESSURE: 65 MMHG | TEMPERATURE: 98 F

## 2023-11-17 DIAGNOSIS — N48.1 BALANITIS: Primary | ICD-10-CM

## 2023-11-17 LAB
BILIRUB UR QL STRIP: NEGATIVE
CLARITY UR: CLEAR
COLOR UR: YELLOW
GLUCOSE UR STRIP-MCNC: NEGATIVE MG/DL
HGB UR QL STRIP: NEGATIVE
KETONES UR STRIP-MCNC: NEGATIVE MG/DL
LEUKOCYTE ESTERASE UR QL STRIP: NEGATIVE
NITRITE UR QL STRIP: NEGATIVE
PH UR STRIP: 5 [PH]
PROT UR STRIP-MCNC: NEGATIVE MG/DL
SP GR UR STRIP: 1.01
UROBILINOGEN UR STRIP-ACNC: <2 MG/DL

## 2023-11-17 RX ORDER — CLOTRIMAZOLE 1 %
1 CREAM (GRAM) TOPICAL 2 TIMES DAILY
Qty: 14 G | Refills: 0 | Status: SHIPPED | OUTPATIENT
Start: 2023-11-17 | End: 2023-11-20

## 2023-11-17 NOTE — DISCHARGE INSTRUCTIONS
Apply topical cream as directed   Keep skin clean and dry   Follow up with your primary care provider and/or urology

## 2023-11-20 ENCOUNTER — OFFICE VISIT (OUTPATIENT)
Dept: SURGERY | Facility: CLINIC | Age: 53
End: 2023-11-20

## 2023-11-20 DIAGNOSIS — N48.1 BALANITIS: Primary | ICD-10-CM

## 2023-11-20 PROCEDURE — 99204 OFFICE O/P NEW MOD 45 MIN: CPT | Performed by: PHYSICIAN ASSISTANT

## 2023-11-20 RX ORDER — CLOTRIMAZOLE AND BETAMETHASONE DIPROPIONATE 10; .64 MG/G; MG/G
1 CREAM TOPICAL 2 TIMES DAILY
Qty: 1 EACH | Refills: 0 | Status: SHIPPED | OUTPATIENT
Start: 2023-11-20

## 2023-11-20 NOTE — PROGRESS NOTES
Subjective:     Patient ID: Sean Norman is a 45year old male. Albanian speaking.  was present. Presents today with complaints of penile irritation on the 17th. Was seen at in the  and was started on clortimazole. The patient reports that he started the cream and not having an improvement. He is applying it multiple times a day, applying each after each time he urinates. No dysuria, no hematuria, no fever, no chills, no nausea, no vomiting. Sexually active with his wife. Last hgbA1c was Jan 2023 11.8. Has not been checking his blood sugars with a provider. Consult  Pertinent negatives include no abdominal pain, chills, fever, nausea or vomiting. History/Other:   Review of Systems   Constitutional:  Negative for chills and fever. Gastrointestinal:  Negative for abdominal pain, nausea and vomiting. Genitourinary:  Positive for penile pain and penile swelling. Negative for difficulty urinating, dysuria, flank pain, frequency, hematuria, penile discharge, scrotal swelling and testicular pain. Musculoskeletal:  Negative for back pain. Current Outpatient Medications   Medication Sig Dispense Refill    clotrimazole-betamethasone 1-0.05 % External Cream Apply 1 inch topically 2 (two) times daily. Apple one inch twice daily no more than two weeks. 1 each 0    losartan 25 MG Oral Tab Take 1 tablet (25 mg total) by mouth daily. 10 tablet 0    levocetirizine 5 MG Oral Tab Take 1 tablet (5 mg total) by mouth every evening. 30 tablet 0    metFORMIN 850 MG Oral Tab Take 1 tablet (850 mg total) by mouth 2 (two) times daily with meals. 180 tablet 0    glimepiride 2 MG Oral Tab Take 1 tablet (2 mg total) by mouth daily with breakfast. 90 tablet 0    Empagliflozin-linaGLIPtin (GLYXAMBI) 25-5 MG Oral Tab Take 1 tablet by mouth daily. 90 tablet 0    metFORMIN HCl 1000 MG Oral Tab Take 800 mg by mouth 2 (two) times daily with meals.       insulin glargine (SEMGLEE) 100 UNIT/ML Subcutaneous Solution Pen-injector Inject 30 Units into the skin nightly. 5 each 3    semaglutide (OZEMPIC, 0.25 OR 0.5 MG/DOSE,) 2 MG/1.5ML Subcutaneous Solution Pen-injector Inject 0.5 mg into the skin once a week. Initially, 0.25 mg subcutaneously once every 7 days (weekly). Administer the dose at any time of day, with or without meals. After 4 weeks increase the dose to 0.5 mg subcutaneously once weekly. 4 each 3    insulin glargine (LANTUS SOLOSTAR) 100 UNIT/ML Subcutaneous Solution Pen-injector Inject 30 Units into the skin nightly. Box on 32 BD needles 5 each 2    Glucose Blood (CONTOUR NEXT TEST) In Vitro Strip 1 strip by In Vitro route daily. naproxen 500 MG Oral Tab Take 1 tablet (500 mg total) by mouth 2 (two) times daily with meals. 30 tablet 0    ALPRAZolam (XANAX) 0.25 MG Oral Tab Take 1 tablet (0.25 mg total) by mouth nightly as needed for Sleep or Anxiety. To take once per day as needed for anxiety. Can cause sedation/ fatigue. 30 tablet 0    ergocalciferol 1.25 MG (88590 UT) Oral Cap       gabapentin 100 MG Oral Cap Take 1 capsule (100 mg total) by mouth 3 (three) times daily. 90 capsule 1    temazepam 30 MG Oral Cap Take 1 capsule (30 mg total) by mouth nightly as needed for Sleep. 30 capsule 1    Meclizine HCl 25 MG Oral Tab Take 1 tablet (25 mg total) by mouth 3 (three) times daily as needed. 20 tablet 0    cyclobenzaprine 5 MG Oral Tab Take 1 tablet (5 mg total) by mouth nightly as needed for Muscle spasms. (Patient not taking: Reported on 1/12/2023) 20 tablet 0     Allergies:No Known Allergies    Past Medical History:   Diagnosis Date    Diabetes ([facility] [address]     Hyperlipidemia     dx'd in 2003    Type 2 diabetes mellitus ([facility] [address]       No past surgical history on file.    Family History   Problem Relation Age of Onset    Diabetes Father         type 2    Hypertension Mother       Social History:   Social History     Socioeconomic History    Marital status:    Tobacco Use    Smoking status: Never Smokeless tobacco: Never   Vaping Use    Vaping Use: Never used   Substance and Sexual Activity    Alcohol use: No     Alcohol/week: 0.0 standard drinks of alcohol     Comment: non-drinker    Drug use: No   Social History Narrative    ** Merged History Encounter **             Objective:   Physical Exam  Constitutional:       Appearance: Normal appearance. Eyes:      General: No scleral icterus. Conjunctiva/sclera: Conjunctivae normal.   Pulmonary:      Effort: Pulmonary effort is normal. No respiratory distress. Abdominal:      General: There is no distension. Genitourinary:     Comments: Patient has erythema to the glans penis with white exudate noted.,the foreskin is easily retractable. There is no urethral discharge from the meatus. No inguinal lymphadenopathy. No testicular swelling, tenderness, or edema. Neurological:      Mental Status: He is alert. Assessment & Plan:   1. Balanitis    The patient and I had a discussion about balanitis he was given instruction about balanitis and hygiene. I discussed with him that we can change his medication to clotrimazole with betamethasone. He can apply that twice daily for no more than 2 weeks he should not apply it after each time he urinates. I discussed with him that if it is still not improving he can come back in 2 weeks for evaluation, at that point we may need to discuss an adult circumcision. However I did discuss with him that his A1c from January 2023 was elevated it was 11.8. This will delay healing. In addition if he does ultimately require an adult circumcision his A1c needs to be 9 or less in order to be considered for surgery. He knows to stop clotrimazole which she was prescribed at the urgent care. Start clotrimazole with betamethasone. Follow-up in 2 weeks if he still not getting better and has completed treatment. All questions were answered. No orders of the defined types were placed in this encounter.       Meds This Visit:  Requested Prescriptions     Signed Prescriptions Disp Refills    clotrimazole-betamethasone 1-0.05 % External Cream 1 each 0     Sig: Apply 1 inch topically 2 (two) times daily. Apple one inch twice daily no more than two weeks.        Imaging & Referrals:  None

## 2023-12-11 ENCOUNTER — HOSPITAL ENCOUNTER (OUTPATIENT)
Age: 53
Discharge: HOME | End: 2023-12-11
Payer: COMMERCIAL

## 2023-12-11 VITALS
DIASTOLIC BLOOD PRESSURE: 83 MMHG | OXYGEN SATURATION: 95 % | HEART RATE: 73 BPM | SYSTOLIC BLOOD PRESSURE: 121 MMHG | TEMPERATURE: 98 F | BODY MASS INDEX: 33.75 KG/M2 | HEIGHT: 66 IN | WEIGHT: 210 LBS | RESPIRATION RATE: 20 BRPM

## 2023-12-11 DIAGNOSIS — Z87.898 HISTORY OF FEVER: Primary | ICD-10-CM

## 2023-12-11 LAB
POCT INFLUENZA A: NEGATIVE
POCT INFLUENZA B: NEGATIVE
SARS-COV-2 RNA RESP QL NAA+PROBE: NOT DETECTED

## 2023-12-11 PROCEDURE — 99213 OFFICE O/P EST LOW 20 MIN: CPT | Performed by: NURSE PRACTITIONER

## 2023-12-11 PROCEDURE — 87502 INFLUENZA DNA AMP PROBE: CPT | Performed by: NURSE PRACTITIONER

## 2023-12-11 PROCEDURE — U0002 COVID-19 LAB TEST NON-CDC: HCPCS | Performed by: NURSE PRACTITIONER

## 2023-12-11 NOTE — ED INITIAL ASSESSMENT (HPI)
Pt is here for fever since yesterday afternoon. No other symptoms  Pt states 101 temp. Pt needs a work note.

## 2023-12-11 NOTE — DISCHARGE INSTRUCTIONS
Please see instructions regarding the EpiPen. Follow-up with your primary care doctor if your fever returns. Return for any concerns.

## 2024-01-05 ENCOUNTER — HOSPITAL ENCOUNTER (OUTPATIENT)
Age: 54
Discharge: HOME | End: 2024-01-05
Payer: COMMERCIAL

## 2024-01-05 ENCOUNTER — NURSE TRIAGE (OUTPATIENT)
Dept: FAMILY MEDICINE CLINIC | Facility: CLINIC | Age: 54
End: 2024-01-05

## 2024-01-05 VITALS
SYSTOLIC BLOOD PRESSURE: 130 MMHG | OXYGEN SATURATION: 98 % | TEMPERATURE: 99 F | RESPIRATION RATE: 16 BRPM | HEART RATE: 81 BPM | DIASTOLIC BLOOD PRESSURE: 52 MMHG

## 2024-01-05 DIAGNOSIS — U07.1 COVID-19: Primary | ICD-10-CM

## 2024-01-05 LAB
POCT INFLUENZA A: NEGATIVE
POCT INFLUENZA B: NEGATIVE
S PYO AG THROAT QL: NEGATIVE
SARS-COV-2 RNA RESP QL NAA+PROBE: DETECTED

## 2024-01-05 RX ORDER — FLUTICASONE PROPIONATE 50 MCG
2 SPRAY, SUSPENSION (ML) NASAL DAILY
Qty: 16 G | Refills: 0 | Status: SHIPPED | OUTPATIENT
Start: 2024-01-05

## 2024-01-05 RX ORDER — BENZONATATE 100 MG/1
100 CAPSULE ORAL 3 TIMES DAILY PRN
Qty: 30 CAPSULE | Refills: 0 | Status: SHIPPED | OUTPATIENT
Start: 2024-01-05

## 2024-01-05 NOTE — ED PROVIDER NOTES
Patient Seen in: [facility]      History     Chief Complaint   Patient presents with    Cough    Nasal Congestion     Stated Complaint: flu/cold; weakness and fever    Subjective:   HPI    This is a 53-year-old male with history of diabetes hyperlipidemia presenting with cough congestion phlegm in the throat flulike symptoms.  [employer]ia [employer]  patient states his symptoms started yesterday no sick contacts at home possible sick contacts at work.  Denies fever chest pain shortness of breath nausea vomiting or diarrhea.    Objective:   Past Medical History:   Diagnosis Date    Diabetes ([facility])     Hyperlipidemia     dx'd in 2003    Type 2 diabetes mellitus ([facility])               History reviewed. No pertinent surgical history.             Social History     Socioeconomic History    Marital status:    Tobacco Use    Smoking status: Never    Smokeless tobacco: Never   Vaping Use    Vaping Use: Never used   Substance and Sexual Activity    Alcohol use: No     Alcohol/week: 0.0 standard drinks of alcohol     Comment: non-drinker    Drug use: No   Social History Narrative    ** Merged History Encounter **                   Review of Systems    Positive for stated complaint: flu/cold; weakness and fever  Other systems are as noted in HPI.  Constitutional and vital signs reviewed.      All other systems reviewed and negative except as noted above.    Physical Exam     ED Triage Vitals [01/05/24 1223]   /52   Pulse 81   Resp 16   Temp 98.7 °F (37.1 °C)   Temp src Temporal   SpO2 98 %   O2 Device None (Room air)       Current:/52   Pulse 81   Temp 98.7 °F (37.1 °C) (Temporal)   Resp 16   SpO2 98%         Physical Exam  Vitals and nursing note reviewed.   Constitutional:       Appearance: Normal appearance.   HENT:      Right Ear: Tympanic membrane normal.      Left Ear: Tympanic membrane normal.      Nose: Congestion present. No rhinorrhea.      Mouth/Throat:       Mouth: Mucous membranes are moist.      Pharynx: Oropharynx is clear. Posterior oropharyngeal erythema present. No oropharyngeal exudate.   Eyes:      Conjunctiva/sclera: Conjunctivae normal.   Cardiovascular:      Rate and Rhythm: Normal rate.   Pulmonary:      Effort: Pulmonary effort is normal. No respiratory distress.      Breath sounds: Normal breath sounds. No wheezing.      Comments: + cough  Musculoskeletal:         General: Normal range of motion.      Cervical back: Normal range of motion. No rigidity or tenderness.   Lymphadenopathy:      Cervical: No cervical adenopathy.   Neurological:      Mental Status: He is alert and oriented to person, place, and time.               ED Course     Labs Reviewed   RAPID SARS-COV-2 BY PCR - Abnormal; Notable for the following components:       Result Value    Rapid SARS-CoV-2 by PCR Detected (*)     All other components within normal limits   POCT RAPID STREP - Normal   POCT FLU TEST - Normal    Narrative:     This assay is a rapid molecular in vitro test utilizing nucleic acid amplification of influenza A and B viral RNA.                      MDM               Medical Decision Making  53-year-old male nontoxic-appearing presenting with viral symptoms concerning for COVID flu possible viral or bacterial pharyngitis he will be swabbed for all 3 no clinical indication for labs or imaging this was discussed with the patient he is agreeable with this plan of care.    COVID-positive flu and strep negative Via [employer]  discussed results with the patient.  Discussed treatment with benzonatate for the cough and Flonase nasal spray to help with any runny nose or congestion.  Discussed over-the-counter Tylenol for any body aches chills or discomfort or pain.  Discussed social isolation per CDC protocol.  All education instructions including ER precautions placed in discharge paperwork.  Patient acknowledged understanding  instructions.    Problems Addressed:  COVID-19: acute illness or injury    Amount and/or Complexity of Data Reviewed  Labs:  Decision-making details documented in ED Course.    Risk  OTC drugs.  Prescription drug management.        Disposition and Plan     Clinical Impression:  1. COVID-19         Disposition:  Discharge  1/5/2024  1:05 pm    Follow-up:  Donavan Kumar MD  [address] [place] [address]  [phone]      As needed          Medications Prescribed:  Discharge Medication List as of 1/5/2024  1:05 PM        START taking these medications    Details   benzonatate 100 MG Oral Cap Take 1 capsule (100 mg total) by mouth 3 (three) times daily as needed for cough., Normal, Disp-30 capsule, R-0      fluticasone propionate 50 MCG/ACT Nasal Suspension 2 sprays by Nasal route daily., Normal, Disp-16 g, R-0

## 2024-01-05 NOTE — DISCHARGE INSTRUCTIONS
Social isolation for 5 days on day 6 you may return to work wearing a mask for additional 5 days as you could be still shedding the virus as this is the recommendation of the CDC.  If you develop worsening symptoms such as chest pain shortness of breath nausea vomiting or diarrhea severe headache or high fever go to the emergency department.  For mild symptoms such as body aches chills slight headache low-grade temp take Tylenol.  Hydrate well but do not over hydrate and eat food as tolerated.

## 2024-01-05 NOTE — TELEPHONE ENCOUNTER
Action Requested: Summary for Provider     []  Critical Lab, Recommendations Needed  [] Need Additional Advice  [x]   FYI    []   Need Orders  [] Need Medications Sent to Pharmacy  []  Other     SUMMARY:   Spoke with pt via  Brian ID # [id number],  verified.  Pt req an appt with Dr Trammell or Dr Edward.   Pt c/o flu sx, cough with white phlegm, last noc  his temp was 100.1-100.2 but pt doesn't have a thermometer, he was just guessing.   Pt was advised to get a thermometer to monitor his temp, he agree.   Pt have not test for covid, he preferred to get the test from the office.   Pt denies chest pain, shortness of breath, headache, no other sx.   Pt was advised if sx persist or gets worse to go to ER/ IC, he agreed and stated understanding.   He was offered an appt with Harsh TORRES for eval & treat, he agree and stated understanding.   Pt was advised to wear mask in the office.       Reason for call: cough  Onset: Data Unavailable        Reason for Disposition   Patient wants to be seen    Protocols used: Influenza - Seasonal-A-[place]    Future Appointments   Date Time Provider Department Center   2024  3:40 PM Harsh Abreu APRN [facility]  [facility]   2024 10:00 AM Raul Campbell MD [facility]  [place]       
Chat shows pt is at Urgent Care   
Left message to pt to call back cell # and wife #. In person appt given to pt by mistake.   Pt was informed via voice mail appt was switched to virtual due to poss fever he had last noc.     
Noted pt seen at [facility]. No further action is needed.  
[facility]

## 2024-01-11 ENCOUNTER — HOSPITAL ENCOUNTER (OUTPATIENT)
Age: 54
Discharge: LEFT BEFORE DISCHARGE | End: 2024-01-11
Payer: COMMERCIAL

## 2024-01-15 ENCOUNTER — APPOINTMENT (OUTPATIENT)
Dept: GENERAL RADIOLOGY | Age: 54
End: 2024-01-15
Attending: NURSE PRACTITIONER

## 2024-01-15 ENCOUNTER — HOSPITAL ENCOUNTER (EMERGENCY)
Age: 54
Discharge: HOME | End: 2024-01-15

## 2024-01-15 VITALS
HEIGHT: 70 IN | HEART RATE: 79 BPM | DIASTOLIC BLOOD PRESSURE: 79 MMHG | TEMPERATURE: 97.9 F | OXYGEN SATURATION: 98 % | SYSTOLIC BLOOD PRESSURE: 135 MMHG | BODY MASS INDEX: 31.78 KG/M2 | WEIGHT: 222 LBS | RESPIRATION RATE: 17 BRPM

## 2024-01-15 DIAGNOSIS — S62.639B OPEN FRACTURE OF TUFT OF DISTAL PHALANX OF FINGER: Primary | ICD-10-CM

## 2024-01-15 DIAGNOSIS — S61.319A LACERATION OF NAIL BED OF FINGER, INITIAL ENCOUNTER: ICD-10-CM

## 2024-01-15 PROCEDURE — 73140 X-RAY EXAM OF FINGER(S): CPT

## 2024-01-15 PROCEDURE — 10002803 HB RX 637: Performed by: NURSE PRACTITIONER

## 2024-01-15 PROCEDURE — 12001 RPR S/N/AX/GEN/TRNK 2.5CM/<: CPT

## 2024-01-15 RX ORDER — CEPHALEXIN 500 MG/1
500 CAPSULE ORAL 2 TIMES DAILY
Qty: 20 CAPSULE | Refills: 0 | Status: SHIPPED | OUTPATIENT
Start: 2024-01-15 | End: 2024-01-25

## 2024-01-15 RX ORDER — HYDROCODONE BITARTRATE AND ACETAMINOPHEN 5; 325 MG/1; MG/1
1 TABLET ORAL EVERY 4 HOURS PRN
Qty: 8 TABLET | Refills: 0 | Status: SHIPPED | OUTPATIENT
Start: 2024-01-15 | End: 2024-01-23

## 2024-01-15 RX ORDER — LIDOCAINE HYDROCHLORIDE 10 MG/ML
5 INJECTION, SOLUTION EPIDURAL; INFILTRATION; INTRACAUDAL; PERINEURAL ONCE
Status: DISCONTINUED | OUTPATIENT
Start: 2024-01-15 | End: 2024-01-15 | Stop reason: HOSPADM

## 2024-01-15 RX ORDER — IBUPROFEN 600 MG/1
600 TABLET ORAL ONCE
Status: COMPLETED | OUTPATIENT
Start: 2024-01-15 | End: 2024-01-15

## 2024-01-15 RX ORDER — HYDROCODONE BITARTRATE AND ACETAMINOPHEN 5; 325 MG/1; MG/1
1 TABLET ORAL ONCE
Status: DISCONTINUED | OUTPATIENT
Start: 2024-01-15 | End: 2024-01-15

## 2024-01-15 RX ORDER — NAPROXEN 500 MG/1
500 TABLET ORAL
Qty: 30 TABLET | Refills: 0 | Status: SHIPPED | OUTPATIENT
Start: 2024-01-15 | End: 2024-01-25

## 2024-01-15 RX ADMIN — IBUPROFEN 600 MG: 600 TABLET, FILM COATED ORAL at 19:38

## 2024-01-15 ASSESSMENT — PAIN SCALES - GENERAL
PAINLEVEL_OUTOF10: 2
PAINLEVEL_OUTOF10: 6

## 2024-01-16 ENCOUNTER — TELEPHONE (OUTPATIENT)
Dept: FAMILY MEDICINE CLINIC | Facility: CLINIC | Age: 54
End: 2024-01-16

## 2024-01-16 NOTE — TELEPHONE ENCOUNTER
Call center please call and schedule an appointment.  Thank You.           [facility]  Outside Information  From: Donavan Kumar MD   Original Sender: Discharge Provider, Automatic ([facility])   Addressed To: Donavan Kumar MD   Routed To: Elsi Villasenor MA; Em Rn Triage   Context: Emergency Discharge   Subject: FW: Emergency Discharge on 1/15/2024      FW: Emergency Discharge on 1/15/2024  Received: Today  Donavan Kumar MD  P Em Rn Triage; Elsi Villasenor MA    Please assist patient in make appt with me.    RMMD          Previous Messages        Discharge Summary  01/15/2024      Leonel Boateng - Male; born [birth year]Discharge Summary, generated on Ritchie. 15, 2024January 15, 2024   Reason for Visit      Reason for Visit -  Reason Comments   Finger Injury         Encounter Details      Encounter Details  Date Type Department Care Team Description   01/15/2024 7:21 PM CST - 01/15/2024 9:24 PM [facility] Emergency [facility] EMERGENCY   [address] [place] [address] [phone]  Nuzhat Milan PA-C   [address] [place] [address]   [phone] (Work)   [phone] (Fax)      Bridget Lawrence, Jeronimo Malone RN  Discharge Disposition: Home or Self Care     Allergies  - documented as of this encounter (statuses as of 01/15/2024)    No known active allergies    Medications  - documented as of this encounter (statuses as of 01/15/2024)Reconcile with Patient's Chart    Medications  Medication Sig Dispensed Refills Start Date End Date Status   EPINEPHrine 0.3 MG/0.3ML auto-injector  Inject 0.3 mLs into the muscle 1 time as needed for Anaphylaxis. 1 each  0 10/24/2023   Active   cephalexin (Keflex) 500 MG capsule  Take 1 capsule by mouth 2 times daily for 10 days. 20 capsule  0 01/15/2024 01/25/2024 Active   naproxen (NAPROSYN) 500 MG tablet  Take 1 tablet by mouth 3 times daily (with meals) for 10 days. 30 tablet  0  01/15/2024 01/25/2024 Active   HYDROcodone-acetaminophen (NORCO) 5-325 MG per tablet   Indications: Open fracture of tuft of distal phalanx of finger, Laceration of nail bed of finger, initial encounter Take 1 tablet by mouth every 4 hours as needed for Pain. 8 tablet  0 01/15/2024 01/23/2024 Active     Active Problems  - documented as of this encounter (statuses as of 01/15/2024)Reconcile with Patient's Chart    Not on file       Social History  - documented as of this encounter    Social History  Tobacco Use Types Packs/Day Years Used Date   Smoking Tobacco: Never Assessed             Social History  Inadequate Housing Answer Date Recorded   Social Determinants: Housing (Overall Score [place]) 0 10/24/2023     Social History  Sex and Gender Information Value Date Recorded   Sex Assigned at Birth Not on file     Gender Identity Not on file     Sexual Orientation Not on file       Social History  Job Start Date Occupation Industry   Not on file Not on file Not on file     Last Filed Vital Signs  - documented in this encounter    Last Filed Vital Signs  Vital Sign Reading Time Taken Comments   Blood Pressure 135/79 01/15/2024 6:31 PM CST     Pulse 79 01/15/2024 6:31 PM CST     Temperature 36.6 °C (97.9 °F) 01/15/2024 6:31 PM CST     Respiratory Rate 17 01/15/2024 6:31 PM CST     Oxygen Saturation 98% 01/15/2024 6:31 PM CST     Inhaled Oxygen Concentration - -     Weight 100.7 kg (222 lb 0.1 oz) 01/15/2024 6:31 PM CST     Height 177.8 cm (5' 10\") 01/15/2024 6:31 PM CST     Body Mass Index 31.85 01/15/2024 6:31 PM CST       Discharge Summaries  - documented in this encounter    Not on file    Discharge Instructions  - documented in this encounter    Table of Contents for Discharge Instructions   Discharge Instructions   Attachments      Discharge Instructions  Nuzhat Milan PA-C - 01/15/2024 9:29 PM CST   Formatting of this note might be different from the original.  Please take antibiotic as prescribed. Gently wash  area with antibacterial soap (such as Dial) and water daily. Do not scrub or pick at the area. Apply antibiotic ointment (such as Neosporin or Bacitracin) daily. Follow up with your primary care provider / hand specialist in 2-3 days for wound check and in 7 days for suture removal. Monitor for signs of infection such as redness, drainage, pus formation, warm to the touch, or high fevers. Return to emergency department if signs of infection, high fever, numbness or tingling of the effected area, or opening of wound.  Electronically signed by Nuzhat Milan PA-C at 01/15/2024 9:29 PM CST     Back to top of Discharge Instructions  Attachments  The following attachments cannot be sent through Care Everywhere.   Fracture, Finger, Open ([place])  Back to top of Discharge Instructions      Medications at Time of Discharge  - documented as of this encounter    Medications at Time of Discharge  Medication Sig Dispensed Refills Start Date End Date   EPINEPHrine 0.3 MG/0.3ML auto-injector  Inject 0.3 mLs into the muscle 1 time as needed for Anaphylaxis. 1 each  0 10/24/2023       Ordered Prescriptions  - documented in this encounterReconcile with Patient's Chart    Ordered Prescriptions  Prescription Sig Dispensed Refills Start Date End Date   HYDROcodone-acetaminophen (NORCO) 5-325 MG per tablet   Indications: Open fracture of tuft of distal phalanx of finger, Laceration of nail bed of finger, initial encounter Take 1 tablet by mouth every 4 hours as needed for Pain. 8 tablet  0 01/15/2024 01/23/2024   naproxen (NAPROSYN) 500 MG tablet  Take 1 tablet by mouth 3 times daily (with meals) for 10 days. 30 tablet  0 01/15/2024 01/25/2024   cephalexin (Keflex) 500 MG capsule  Take 1 capsule by mouth 2 times daily for 10 days. 20 capsule  0 01/15/2024 01/25/2024     ED Notes  - documented in this encounter    Table of Contents for ED Notes   Jeronimo Lombardo, RN - 01/15/2024 9:58 PM CST   Bridget Lawrence RN - 01/15/2024  7:25 PM CST   Loreto Sal CNP - 01/15/2024 6:54 PM CST   Louisa Florian RN - 01/15/2024 6:31 PM CST      Jeronimo Lombardo RN - 01/15/2024 9:58 PM CST  Formatting of this note might be different from the original.  Discharge instructions reviewed with patient . Patient verbalizing understanding the importance of following up and any concerns that would require patient to come back to the ED. Vitals stable. Patient able to dress self and walked out of ER.    Electronically signed by Jeronimo Lombardo, RN at 01/15/2024 9:58 PM CST   Back to top of ED Notes  Bridget Lawrence RN - 01/15/2024 7:25 PM CST  Formatting of this note might be different from the original.  No SBM tasks.  Electronically signed by Bridget Lawrence RN at 01/15/2024 7:25 PM CST   Back to top of ED Notes  Loreto Sal CNP - 01/15/2024 6:54 PM CST  Formatting of this note might be different from the original.  MEDICAL SCREENING EXAM (MSE) NOTE    Leonel Boateng is a 53 year old male who presented to the ER today with complaints of left 3rd finger injury. States something fell on it while at work.  Up to date on his tetanus.    Nail bed     Brief Physical Exam  General: Aox4. Nontoxic.  Neurologic: NAD    This patient was screened by me for the purpose of initial evaluation. I have screened the patient for an acute medical condition and have placed orders for the patient's diagnosis and treatment.      Loreto Sal CNP  01/15/24 [id number]    Electronically signed by Loreto Sal CNP at 01/15/2024 6:55 PM CST   Back to top of ED Notes  Louisa Florian, RN - 01/15/2024 6:31 PM CST  Formatting of this note might be different from the original.  Pt presents to ED with L middle finger injury that occurred at work.  Electronically signed by Louisa Florian, RN at 01/15/2024 6:32 PM CST   Back to top of ED Notes      Plan of Treatment  - documented as of this encounter    Plan of Treatment - Pending Results  Pending  Results  Name Type Priority Associated Diagnoses Date/Time   Laceration Repair Procedures Routine   01/15/2024 9:30 PM CST   Splint Application Procedures Routine   01/15/2024 9:31 PM CST     Procedures  - documented in this encounter    Procedures  Procedure Name Priority Date/Time Associated Diagnosis Comments   ED SPLINT APPLICATION Routine 01/15/2024 9:31 PM CST       ED LACERATION REPAIR Routine 01/15/2024 9:30 PM CST       XR FINGER(S) 2 OR MORE VIEWS LEFT STAT 01/15/2024 7:10 PM CST   Results for this procedure are in the results section.      Results  - documented in this encounter    XR FINGER(S) 2 OR MORE VIEWS LEFT (01/15/2024 7:10 PM CST)  Results - XR FINGER(S) 2 OR MORE VIEWS LEFT (01/15/2024 7:10 PM CST)  Anatomical Region Laterality Modality   Hand Left Digital Radiography     Results - XR FINGER(S) 2 OR MORE VIEWS LEFT (01/15/2024 7:10 PM CST)  Specimen (Source) Anatomical Location / Laterality Collection Method / Volume Collection Time Received Time         01/15/2024 7:37 PM CST       Results - XR FINGER(S) 2 OR MORE VIEWS LEFT (01/15/2024 7:10 PM CST)  Impressions   01/15/2024 7:38 PM CST   FINDINGS/IMPRESSION:  Moderately displaced and comminuted fracture at the distal tuft of the left  third distal phalanx. No intra-articular extension. Diffuse swelling of the  distal third digit. No radiopaque foreign body.    Electronically Signed by: ANSELMO CARRERA M.D.  Signed on: 1/15/2024 7:38 PM  Workstation ID: [id number]      Results - XR FINGER(S) 2 OR MORE VIEWS LEFT (01/15/2024 7:10 PM CST)  Narrative   01/15/2024 7:38 PM CST   EXAM: XR FINGER(S) 2 OR MORE VIEWS LEFT    CLINICAL INDICATION: l finger pain    TECHNIQUE: AP view of the left hand along with oblique and lateral views of  the left third digit.    COMPARISON: None.        Results - XR FINGER(S) 2 OR MORE VIEWS LEFT (01/15/2024 7:10 PM CST)  Procedure Note   Anselmo Carrera MD - 01/15/2024   Formatting of this note might be different from the  original.  EXAM: XR FINGER(S) 2 OR MORE VIEWS LEFT    CLINICAL INDICATION: l finger pain    TECHNIQUE: AP view of the left hand along with oblique and lateral views of  the left third digit.    COMPARISON: None.    IMPRESSION:  FINDINGS/IMPRESSION:  Moderately displaced and comminuted fracture at the distal tuft of the left  third distal phalanx. No intra-articular extension. Diffuse swelling of the  distal third digit. No radiopaque foreign body.    Electronically Signed by: JAYME CARRERA M.D.  Signed on: 1/15/2024 7:38 PM  Workstation ID: [id number]     Results - XR FINGER(S) 2 OR MORE VIEWS LEFT (01/15/2024 7:10 PM CST)  Authorizing Provider Result Type   Loreto CANDIDO Sal CNP IMG XR PROCEDURES          Visit Diagnoses  - documented in this encounter    Visit Diagnoses  Diagnosis   Open fracture of tuft of distal phalanx of finger - Primary   Open fracture of distal phalanx or phalanges of hand    Laceration of nail bed of finger, initial encounter      Discharge Diagnoses  - documented in this encounter    Not on file    Administered Medications  - documented in this encounter    Administered Medications - Inactive Administered Medications - up to 1 most recent administrations  Inactive Administered Medications - up to 1 most recent administrations  Medication Order MAR Action Action Date Dose Rate Site   ibuprofen (MOTRIN) tablet 600 mg   600 mg ONCE, Oral, On Mon 1/15/24 at 1900, For 1 dose, Give with food or milk whenever possible to reduce GI effects. Maximum of 3,200 mg ibuprofen per 24 hours from ALL sources. Verify maximum is not exceeded.  Given 01/15/2024 7:38 PM  mg               Active and Recently Administered Medications  - documented in this encounter    Active and Recently Administered Medications - Legend  Legend  Given Not Given Canceled Entry Held Due Other Actions     Time (Time) Time Time Time-Action      Active and Recently Administered Medications - Scheduled  Scheduled  Medication Order  01/13/2024 01/14/2024 01/15/2024   ibuprofen (MOTRIN) tablet 600 mg (COMPLETED)  600 mg ONCE, Oral, On Mon 1/15/24 at 1900, For 1 dose, Give with food or milk whenever possible to reduce GI effects. Maximum of 3,200 mg ibuprofen per 24 hours from ALL sources. Verify maximum is not exceeded.      1938     lidocaine HCl (PF) (XYLOCAINE) 1 % injection 50 mg  50 mg ONCE (5 mL), Injection, On Mon 1/15/24 at 1900, For 1 dose      1900       Insurance  - documented as of this encounter    Insurance  Payer Benefit Plan / Group Subscriber ID Effective Dates Phone Address Type   WC-PAYOR UNKNOWN WCG[employer]IC [id number] Effective for all dates [phone] [address] [address]  [place]     Insurance  Guarantor Name Account Type Relation to Patient Date of Birth Phone Billing Address   [id number] NUT Workers Comp Employer   [phone] (Work)  [address] [address]      Care Teams  - documented as of this encounter    Care Teams  Team Member Relationship Specialty Start Date End Date   Donavan Kumar MD   NPI: [id number]   [address] [place] [address] [address]   [place] [address]   [phone] (Work)   [phone] (Fax)  PCP - [facility] 10/24/23       Patient Demographics    Patient Demographics  Patient Address Communication Language Race / Ethnicity Marital Status   [address] (Home)  [place] [address]    Former (Oct. 24, 2023 - Oct. 23, 2023):  [address] (Home)  [place] [address]    Former (Oct. 24, 2023 - Oct. 23, 2023):  (Home) [phone] (Mobile) Cambodian - Written White /  or  /Civil Union     Patient Contacts    Patient Contacts  Contact Name Contact Address Communication Relationship to Patient   Leonel Hernandez JrPete Unknown [phone] (Mobile) Son, Emergency Contact     Document Information    Primary Care Provider Other Service Providers Document Coverage Dates   Donavan Kumar MD (Oct. 24, 2023October 24, 2023 - Present)  NPI:  [id number]  [phone] (Work)  [phone] (Fax)  [address] [facility] [address]  [place] [address]  [facility]  [address] [place] [address]  Ritchie. 15, 2024January 15, 2024      Organization   [facility] [address] [address]     Encounter Providers Encounter Date   Nuzhat Milan PA-C (Attending)  NPI: [id number]  [phone] (Work)  [phone] (Fax)  [address] [place] [address]  Physician Assistant     Bridget Lawrence, VERÓNICA (Attending)   Emergency Medicine     Jeronimo Lombardo RN (Attending)   Emergency Medicine Ritchie. 15, 2024January 15, 2024       Legal Authenticator     FOR HEALTH INFORMATION AND CODING [facility]

## 2024-01-19 NOTE — TELEPHONE ENCOUNTER
CSS attempted to call patient x2, no response    Not mychart active    Future Appointments   Date Time Provider Department Center   1/23/2024 10:00 AM Raul Campbell MD [facility]

## 2024-02-05 ENCOUNTER — MED REC SCAN ONLY (OUTPATIENT)
Dept: FAMILY MEDICINE CLINIC | Facility: CLINIC | Age: 54
End: 2024-02-05

## 2024-02-21 VITALS
OXYGEN SATURATION: 99 % | SYSTOLIC BLOOD PRESSURE: 125 MMHG | HEART RATE: 89 BPM | WEIGHT: 209.44 LBS | BODY MASS INDEX: 34 KG/M2 | TEMPERATURE: 98 F | RESPIRATION RATE: 18 BRPM | DIASTOLIC BLOOD PRESSURE: 80 MMHG

## 2024-02-21 PROCEDURE — 99284 EMERGENCY DEPT VISIT MOD MDM: CPT

## 2024-02-21 PROCEDURE — 99283 EMERGENCY DEPT VISIT LOW MDM: CPT

## 2024-02-22 ENCOUNTER — HOSPITAL ENCOUNTER (EMERGENCY)
Facility: HOSPITAL | Age: 54
Discharge: HOME | End: 2024-02-22
Attending: EMERGENCY MEDICINE
Payer: COMMERCIAL

## 2024-02-22 DIAGNOSIS — L50.9 URTICARIA: Primary | ICD-10-CM

## 2024-02-22 RX ORDER — PREDNISONE 20 MG/1
20 TABLET ORAL DAILY
Qty: 5 TABLET | Refills: 0 | Status: SHIPPED | OUTPATIENT
Start: 2024-02-22 | End: 2024-02-27

## 2024-02-22 RX ORDER — PREDNISONE 20 MG/1
60 TABLET ORAL ONCE
Status: COMPLETED | OUTPATIENT
Start: 2024-02-22 | End: 2024-02-22

## 2024-02-22 RX ORDER — DIPHENHYDRAMINE HCL 25 MG
50 CAPSULE ORAL ONCE
Status: COMPLETED | OUTPATIENT
Start: 2024-02-22 | End: 2024-02-22

## 2024-02-22 RX ORDER — FAMOTIDINE 20 MG/1
20 TABLET, FILM COATED ORAL ONCE
Status: COMPLETED | OUTPATIENT
Start: 2024-02-22 | End: 2024-02-22

## 2024-02-22 NOTE — DISCHARGE INSTRUCTIONS
Take prednisone as prescribed beginning tomorrow (2/22).  Take over-the-counter Benadryl up to every 4 hours as needed for rash or itching.  Take over-the-counter Pepcid twice daily over the next 5 days.  Follow-up with primary physician and allergist as discussed.  Return to the emergency department if difficulty swallowing, difficulty breathing, or other new symptoms develop.

## 2024-02-22 NOTE — ED PROVIDER NOTES
Patient Seen in: [facility] Emergency Department      History     Chief Complaint   Patient presents with    Rash Skin Problem     Stated Complaint: allergic reaction, swelling    Subjective:   HPI    53-year-old male with history of diabetes and hyperlipidemia presents with complaints of rash and itching.  The patient reports rash, itching, and swelling to his skin of his trunk and extremities x 4.  He reports onset of symptoms around 9 PM.  He denies any difficulty swallowing or breathing.  He denies any throat tightness.  He reports having similar symptoms in the past and has been treated for allergic urticaria.  He states that he has followed with an allergist but no cause of the symptoms have been found.  The patient states that he works with peanuts at times and seems to have this reaction during those times when he works with peanuts.    Objective:   Past Medical History:   Diagnosis Date    Diabetes (HCC)     Hyperlipidemia     dx'd in 2003    Type 2 diabetes mellitus (HCC)               History reviewed. No pertinent surgical history.             Social History     Socioeconomic History    Marital status:    Tobacco Use    Smoking status: Never    Smokeless tobacco: Never   Vaping Use    Vaping Use: Never used   Substance and Sexual Activity    Alcohol use: No     Alcohol/week: 0.0 standard drinks of alcohol     Comment: non-drinker    Drug use: No   Social History Narrative    ** Merged History Encounter **                   Review of Systems    Positive for stated complaint: allergic reaction, swelling  Other systems are as noted in HPI.  Constitutional and vital signs reviewed.      All other systems reviewed and negative except as noted above.    Physical Exam     ED Triage Vitals [02/21/24 2357]   /80   Pulse 89   Resp 18   Temp 98 °F (36.7 °C)   Temp src    SpO2 99 %   O2 Device None (Room air)       Current:/80   Pulse 89   Temp 98 °F (36.7 °C)   Resp 18   Wt 95 kg    SpO2 99%   BMI 33.80 kg/m²         Physical Exam      General Appearance: alert, no distress  Eyes: pupils equal and round no pallor or injection  ENT, Mouth: mucous membranes moist.  Oropharynx normal-appearing.  No edema noted.  Respiratory: there are no retractions, lungs are clear to auscultation  Cardiovascular: regular rate and rhythm  Gastrointestinal:  abdomen is soft and non tender, no masses, bowel sounds normal  Neurological: Speech normal.  Moving extremities x 4.  Skin: Urticarial appearing rash noted to the trunk, neck, and extremities x 4.  No significant swelling noted.  Musculoskeletal: neck is supple non tender        Extremities are symmetrical, full range of motion  Psychiatric: patient is oriented X 3, there is no agitation    DIFFERENTIAL DIAGNOSIS: After history and physical exam differential diagnosis was considered for urticaria, allergic reaction, or other        ED Course   Labs Reviewed - No data to display                 MDM      Will give prednisone, Benadryl, and Pepcid in the ED.  Will discharge the patient home with same medications.  He is advised to return if he develops any difficulty swallowing, difficulty breathing, or if the rash is worsening.  He is advised to follow-up with his primary physician and an allergist.                                   Medical Decision Making      Disposition and Plan     Clinical Impression:  1. Urticaria         Disposition:  Discharge  2/22/2024  1:17 am    Follow-up:  Raul Campbell MD  [address] [facility] [id number] [phone]    Follow up      Donavan Kumar MD  [address] [address]  [phone]    Follow up            Medications Prescribed:  Current Discharge Medication List        START taking these medications    Details   predniSONE 20 MG Oral Tab Take 1 tablet (20 mg total) by mouth daily for 5 days.  Qty: 5 tablet, Refills: 0

## 2024-02-22 NOTE — ED INITIAL ASSESSMENT (HPI)
Urticarial rash  seen for similar in October 2023. Sent home at that time on prednisone and OTC benadryl.  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 NO SRIDHAR

## 2024-02-23 ENCOUNTER — OFFICE VISIT (OUTPATIENT)
Dept: FAMILY MEDICINE CLINIC | Facility: CLINIC | Age: 54
End: 2024-02-23

## 2024-02-23 VITALS
HEIGHT: 66 IN | SYSTOLIC BLOOD PRESSURE: 129 MMHG | DIASTOLIC BLOOD PRESSURE: 61 MMHG | BODY MASS INDEX: 33.59 KG/M2 | WEIGHT: 209 LBS | HEART RATE: 88 BPM

## 2024-02-23 DIAGNOSIS — Z09 HOSPITAL DISCHARGE FOLLOW-UP: ICD-10-CM

## 2024-02-23 DIAGNOSIS — L50.9 URTICARIA: Primary | ICD-10-CM

## 2024-02-23 PROCEDURE — 3078F DIAST BP <80 MM HG: CPT

## 2024-02-23 PROCEDURE — 3074F SYST BP LT 130 MM HG: CPT

## 2024-02-23 PROCEDURE — 3008F BODY MASS INDEX DOCD: CPT

## 2024-02-23 PROCEDURE — 99213 OFFICE O/P EST LOW 20 MIN: CPT

## 2024-02-23 RX ORDER — CETIRIZINE HYDROCHLORIDE 10 MG/1
10 TABLET ORAL DAILY
Qty: 90 TABLET | Refills: 3 | Status: SHIPPED | OUTPATIENT
Start: 2024-02-23

## 2024-02-23 NOTE — PROGRESS NOTES
Subjective:   Leonel Boateng is a 53 year old male who presents for Allergies (Was seen in urgent care- needs referral for allergy/[facility] #[id number])   Patient is a pleasant 53-year-old Chinese-speaking male with past medical history consistent for hyperlipidemia and diabetes mellitus type 2.  Patient presents to office today for evaluation of allergies.     Of note patient seen in ED 2/21/24 for reports rash, itching, and swelling to his skin of his trunk and extremities x 4.  He reports onset of symptoms around 9 PM.  He denies any difficulty swallowing or breathing.  He denies any throat tightness.  He reports having similar symptoms in the past and has been treated for allergic urticaria. The patient states that he works with peanuts at times and seems to have this reaction during those times when he works with peanuts. Patient was treated with pred burst, benadryl, and pepcid and discharged to follow up.    Last time he saw allergy? 2/2/24 MD [facility]   There was concern that aerosolized exposure to peanuts may have been the trigger.  sIgE testing to peanut and tree nuts was negative. Recommendation was to keep epipen on self for at least next year as no identifiable cause can be identified.       Patient states today via [facility] [id number] rash is coming and going in severity. Would like referral to another allergist so he can figure out what is going on.    Has not picked up pred burst today.         Past Medical History:   Diagnosis Date    Diabetes (HCC)     Hyperlipidemia     dx'd in 2003    Type 2 diabetes mellitus (HCC)       History reviewed. No pertinent surgical history.     History/Other:    Chief Complaint Reviewed and Verified  Nursing Notes Reviewed and   Verified  Tobacco Reviewed  Allergies Reviewed  Medications Reviewed    Problem List Reviewed  Medical History Reviewed  Surgical History   Reviewed  Family History Reviewed         Tobacco:  He has never smoked tobacco.    Current  Outpatient Medications   Medication Sig Dispense Refill    cetirizine 10 MG Oral Tab Take 1 tablet (10 mg total) by mouth daily. 90 tablet 3    predniSONE 20 MG Oral Tab Take 1 tablet (20 mg total) by mouth daily for 5 days. 5 tablet 0    fluticasone propionate 50 MCG/ACT Nasal Suspension 2 sprays by Nasal route daily. 16 g 0    clotrimazole-betamethasone 1-0.05 % External Cream Apply 1 inch topically 2 (two) times daily. Apple one inch twice daily no more than two weeks. 1 each 0    losartan 25 MG Oral Tab Take 1 tablet (25 mg total) by mouth daily. 10 tablet 0    metFORMIN 850 MG Oral Tab Take 1 tablet (850 mg total) by mouth 2 (two) times daily with meals. 180 tablet 0    glimepiride 2 MG Oral Tab Take 1 tablet (2 mg total) by mouth daily with breakfast. 90 tablet 0    Empagliflozin-linaGLIPtin (GLYXAMBI) 25-5 MG Oral Tab Take 1 tablet by mouth daily. 90 tablet 0    metFORMIN HCl 1000 MG Oral Tab Take 800 mg by mouth 2 (two) times daily with meals.      insulin glargine (SEMGLEE) 100 UNIT/ML Subcutaneous Solution Pen-injector Inject 30 Units into the skin nightly. 5 each 3    semaglutide (OZEMPIC, 0.25 OR 0.5 MG/DOSE,) 2 MG/1.5ML Subcutaneous Solution Pen-injector Inject 0.5 mg into the skin once a week. Initially, 0.25 mg subcutaneously once every 7 days (weekly). Administer the dose at any time of day, with or without meals. After 4 weeks increase the dose to 0.5 mg subcutaneously once weekly. 4 each 3    insulin glargine (LANTUS SOLOSTAR) 100 UNIT/ML Subcutaneous Solution Pen-injector Inject 30 Units into the skin nightly. Box on 32 BD needles 5 each 2    Glucose Blood (CONTOUR NEXT TEST) In Vitro Strip 1 strip by In Vitro route daily.      naproxen 500 MG Oral Tab Take 1 tablet (500 mg total) by mouth 2 (two) times daily with meals. 30 tablet 0    ALPRAZolam (XANAX) 0.25 MG Oral Tab Take 1 tablet (0.25 mg total) by mouth nightly as needed for Sleep or Anxiety. To take once per day as needed for anxiety. Can  cause sedation/ fatigue. 30 tablet 0    ergocalciferol 1.25 MG (38014 UT) Oral Cap       cyclobenzaprine 5 MG Oral Tab Take 1 tablet (5 mg total) by mouth nightly as needed for Muscle spasms. 20 tablet 0    gabapentin 100 MG Oral Cap Take 1 capsule (100 mg total) by mouth 3 (three) times daily. 90 capsule 1    temazepam 30 MG Oral Cap Take 1 capsule (30 mg total) by mouth nightly as needed for Sleep. 30 capsule 1    Meclizine HCl 25 MG Oral Tab Take 1 tablet (25 mg total) by mouth 3 (three) times daily as needed. 20 tablet 0    benzonatate 100 MG Oral Cap Take 1 capsule (100 mg total) by mouth 3 (three) times daily as needed for cough. (Patient not taking: Reported on 2/23/2024) 30 capsule 0         Review of Systems:  Review of Systems   Constitutional: Negative.  Negative for activity change, chills and fever.   HENT: Negative.  Negative for congestion, ear pain, postnasal drip, sinus pain, sore throat and trouble swallowing.    Respiratory: Negative.  Negative for cough, shortness of breath and wheezing.    Cardiovascular: Negative.  Negative for chest pain and leg swelling.   Gastrointestinal: Negative.  Negative for abdominal pain, blood in stool, constipation and diarrhea.   Endocrine: Negative.    Genitourinary: Negative.  Negative for difficulty urinating, dysuria and flank pain.   Musculoskeletal: Negative.  Negative for arthralgias, back pain and neck stiffness.   Skin:  Positive for rash. Negative for color change.   Neurological: Negative.  Negative for dizziness and headaches.   Hematological:  Negative for adenopathy.         Objective:   /61   Pulse 88   Ht 5' 6\" (1.676 m)   Wt 209 lb (94.8 kg)   BMI 33.73 kg/m²  Estimated body mass index is 33.73 kg/m² as calculated from the following:    Height as of this encounter: 5' 6\" (1.676 m).    Weight as of this encounter: 209 lb (94.8 kg).  Physical Exam  Vitals and nursing note reviewed.   Constitutional:       Appearance: Normal appearance. He  is normal weight.   HENT:      Head: Normocephalic.      Right Ear: External ear normal.      Left Ear: External ear normal.   Cardiovascular:      Rate and Rhythm: Normal rate and regular rhythm.      Pulses: Normal pulses.      Heart sounds: Normal heart sounds.   Pulmonary:      Effort: Pulmonary effort is normal.      Breath sounds: Normal breath sounds.   Skin:     General: Skin is warm and dry.      Findings: Rash present.      Comments: Generalized, small patches of redness and itching. Trunk, legs, arms and chest..   Neurological:      Mental Status: He is alert.           Assessment & Plan:   1. Urticaria (Primary)  Assessment & Plan:  Recurrent generalized urticaria  Patient has not picked up pred burst yet. Encouraged to  today.  Stop xyzal, start cetrizine 10 mg q day.  Patient requesting second opinion from allergist.  Referral placed.  Has epi pen, denies throat involvement, solely skin manifestation.    Patient aware of plan of care. All questions answered to satisfaction of the patient. Patient instructed to call office or reach out via [facility] if any issues arise. For urgent issues and/or reviewed red flags please proceed to the urgent care or ER.  Also, inform the nurse practitioner with any new symptoms or medication side effects.        Orders:  -     Allergy Referral - In Network  -     Cetirizine HCl; Take 1 tablet (10 mg total) by mouth daily.  Dispense: 90 tablet; Refill: 3  2. Hospital discharge follow-up  Assessment & Plan:  Ongoing issue with allergic reactions.  Continue work up.        Return if symptoms worsen or fail to improve, for Annual physical.    MELISSA Green, 2/23/2024, 8:14 AM

## 2024-02-23 NOTE — ASSESSMENT & PLAN NOTE
Recurrent generalized urticaria  Patient has not picked up pred burst yet. Encouraged to  today.  Stop xyzal, start cetrizine 10 mg q day.  Patient requesting second opinion from allergist.  Referral placed.  Has epi pen, denies throat involvement, solely skin manifestation.    Patient aware of plan of care. All questions answered to satisfaction of the patient. Patient instructed to call office or reach out via [facility] if any issues arise. For urgent issues and/or reviewed red flags please proceed to the urgent care or ER.  Also, inform the nurse practitioner with any new symptoms or medication side effects.

## 2024-02-24 ENCOUNTER — TELEPHONE (OUTPATIENT)
Dept: FAMILY MEDICINE CLINIC | Facility: CLINIC | Age: 54
End: 2024-02-24

## 2024-02-24 NOTE — TELEPHONE ENCOUNTER
Patient states he picked up the medication Prednisone taking it and allergy is \"stronger\" and wants stronger prescription   Rash with hives and itching all over body    Patient states he did not  the cetirizine yet. Patient advised per office notes he was supposed to discontinue the Xyzol and start this medication.     Patient asking to be seen on Monday with MELISSA Abreu. Appointment scheduled for 8:20am 2/26/24.     Patient advised immediate care for worsening symptoms. Patient verbalized understanding.

## 2024-02-24 NOTE — PROGRESS NOTES
Subjective:   Leonel Boateng is a 53 year old male who presents for Itching (Came in last Friday complaining of a rash, was prescribed a medication but wants a stronger medication. Not in any pain. Says itching began in October of 2023 but went away and returned last Wednesday. )   Patient is a pleasant 53-year-old Northern Irish-speaking male with past medical history consistent for hyperlipidemia and diabetes mellitus type 2, and unknown allergic trigger.  Patient was seen in the office on Friday, 2/23/2024 after being in urgent care on 2/22/2024 (See noted from 2/23/24),  At this time patient had allergic type hive reaction widespread on body that appeared to be lessening with minimal itching.  Patient had not picked up prednisone burst prescription that was prescribed at urgent care.  Patient Xyzal was discontinued and cetirizine was ordered to help with skin manifestations.  Patient was also referred to a second opinion allergist since patient has been seen by allergist in the past and unable to find trigger.  Patient called call center this Saturday requesting higher dose of steroids and to be reevaled.  Presents to office today and states the medications are working. Wants to know if he can get a higher dose so it doesn't come back. Educated patient about how steroids work and how that would not be safe.     He has picked up prednisone and will take last dose today.  Has not picked up RX for cetrizine 10mg, but did by OTC and has started to take.   He has scheduled allergy appt. March 6.         Past Medical History:   Diagnosis Date    Diabetes (HCC)     Hyperlipidemia     dx'd in 2003    Type 2 diabetes mellitus (HCC)       History reviewed. No pertinent surgical history.     History/Other:    Chief Complaint Reviewed and Verified  Nursing Notes Reviewed and   Verified  Tobacco Reviewed  Allergies Reviewed  Medications Reviewed    Problem List Reviewed  Medical History Reviewed  Surgical History    Reviewed  Family History Reviewed         Tobacco:  He has never smoked tobacco.    Current Outpatient Medications   Medication Sig Dispense Refill    cetirizine 10 MG Oral Tab Take 1 tablet (10 mg total) by mouth daily. 90 tablet 3    predniSONE 20 MG Oral Tab Take 1 tablet (20 mg total) by mouth daily for 5 days. 5 tablet 0    clotrimazole-betamethasone 1-0.05 % External Cream Apply 1 inch topically 2 (two) times daily. Apple one inch twice daily no more than two weeks. 1 each 0    metFORMIN 850 MG Oral Tab Take 1 tablet (850 mg total) by mouth 2 (two) times daily with meals. 180 tablet 0    metFORMIN HCl 1000 MG Oral Tab Take 800 mg by mouth 2 (two) times daily with meals.      semaglutide (OZEMPIC, 0.25 OR 0.5 MG/DOSE,) 2 MG/1.5ML Subcutaneous Solution Pen-injector Inject 0.5 mg into the skin once a week. Initially, 0.25 mg subcutaneously once every 7 days (weekly). Administer the dose at any time of day, with or without meals. After 4 weeks increase the dose to 0.5 mg subcutaneously once weekly. 4 each 3    Glucose Blood (CONTOUR NEXT TEST) In Vitro Strip 1 strip by In Vitro route daily.      cyclobenzaprine 5 MG Oral Tab Take 1 tablet (5 mg total) by mouth nightly as needed for Muscle spasms. 20 tablet 0    benzonatate 100 MG Oral Cap Take 1 capsule (100 mg total) by mouth 3 (three) times daily as needed for cough. (Patient not taking: Reported on 2/23/2024) 30 capsule 0    fluticasone propionate 50 MCG/ACT Nasal Suspension 2 sprays by Nasal route daily. (Patient not taking: Reported on 2/26/2024) 16 g 0    losartan 25 MG Oral Tab Take 1 tablet (25 mg total) by mouth daily. 10 tablet 0    glimepiride 2 MG Oral Tab Take 1 tablet (2 mg total) by mouth daily with breakfast. 90 tablet 0    Empagliflozin-linaGLIPtin (GLYXAMBI) 25-5 MG Oral Tab Take 1 tablet by mouth daily. 90 tablet 0    insulin glargine (SEMGLEE) 100 UNIT/ML Subcutaneous Solution Pen-injector Inject 30 Units into the skin nightly. (Patient not  taking: Reported on 2/26/2024) 5 each 3    insulin glargine (LANTUS SOLOSTAR) 100 UNIT/ML Subcutaneous Solution Pen-injector Inject 30 Units into the skin nightly. Box on 32 BD needles (Patient not taking: Reported on 2/26/2024) 5 each 2    naproxen 500 MG Oral Tab Take 1 tablet (500 mg total) by mouth 2 (two) times daily with meals. 30 tablet 0    ALPRAZolam (XANAX) 0.25 MG Oral Tab Take 1 tablet (0.25 mg total) by mouth nightly as needed for Sleep or Anxiety. To take once per day as needed for anxiety. Can cause sedation/ fatigue. (Patient not taking: Reported on 2/26/2024) 30 tablet 0    ergocalciferol 1.25 MG (01520 UT) Oral Cap       gabapentin 100 MG Oral Cap Take 1 capsule (100 mg total) by mouth 3 (three) times daily. (Patient not taking: Reported on 2/26/2024) 90 capsule 1    temazepam 30 MG Oral Cap Take 1 capsule (30 mg total) by mouth nightly as needed for Sleep. (Patient not taking: Reported on 2/26/2024) 30 capsule 1    Meclizine HCl 25 MG Oral Tab Take 1 tablet (25 mg total) by mouth 3 (three) times daily as needed. 20 tablet 0         Review of Systems:  Review of Systems   Constitutional: Negative.  Negative for activity change, chills and fever.   HENT: Negative.  Negative for congestion, ear pain, postnasal drip, sinus pain, sore throat and trouble swallowing.    Respiratory: Negative.  Negative for cough, shortness of breath and wheezing.    Cardiovascular: Negative.  Negative for chest pain and leg swelling.   Gastrointestinal: Negative.  Negative for abdominal pain, blood in stool, constipation and diarrhea.   Endocrine: Negative.    Genitourinary: Negative.  Negative for difficulty urinating, dysuria and flank pain.   Musculoskeletal: Negative.  Negative for arthralgias, back pain and neck stiffness.   Skin: Negative.  Negative for color change and rash.        Minimal and resolved.   Neurological: Negative.  Negative for dizziness and headaches.   Hematological:  Negative for adenopathy.          Objective:   /79   Pulse 76   Ht 5' 6\" (1.676 m)   Wt 209 lb (94.8 kg)   BMI 33.73 kg/m²  Estimated body mass index is 33.73 kg/m² as calculated from the following:    Height as of this encounter: 5' 6\" (1.676 m).    Weight as of this encounter: 209 lb (94.8 kg).  Physical Exam  Vitals and nursing note reviewed.   Constitutional:       Appearance: Normal appearance. He is normal weight.   HENT:      Head: Normocephalic.      Right Ear: Tympanic membrane, ear canal and external ear normal.      Left Ear: Tympanic membrane, ear canal and external ear normal.      Nose: Nose normal.      Mouth/Throat:      Mouth: Mucous membranes are moist.   Eyes:      Extraocular Movements: Extraocular movements intact.      Pupils: Pupils are equal, round, and reactive to light.   Cardiovascular:      Rate and Rhythm: Normal rate and regular rhythm.      Pulses: Normal pulses.      Heart sounds: Normal heart sounds. No murmur heard.  Pulmonary:      Effort: Pulmonary effort is normal. No respiratory distress.      Breath sounds: Normal breath sounds. No wheezing.   Abdominal:      General: There is no distension.      Palpations: Abdomen is soft.      Tenderness: There is no abdominal tenderness.   Musculoskeletal:         General: Normal range of motion.      Cervical back: Normal range of motion and neck supple.      Right lower leg: No edema.      Left lower leg: No edema.   Lymphadenopathy:      Cervical: No cervical adenopathy.   Skin:     General: Skin is warm and dry.      Capillary Refill: Capillary refill takes less than 2 seconds.      Coloration: Skin is not jaundiced or pale.      Findings: Rash present. No bruising, erythema or lesion.      Comments: Minimal, much less that last visit and not visible in most locations.    Neurological:      General: No focal deficit present.      Mental Status: He is alert and oriented to person, place, and time.   Psychiatric:         Mood and Affect: Mood normal.          Behavior: Behavior normal.           Assessment & Plan:   1. Rash (Primary)  Assessment & Plan:   RX for cetrizine.  Continue to take 10 mg daily.  Finish pred burst.  Follow up as needed.    Patient aware of plan of care. All questions answered to satisfaction of the patient. Patient instructed to call office or reach out via [email or web address] if any issues arise. For urgent issues and/or reviewed red flags please proceed to the urgent care or ER.  Also, inform the nurse practitioner with any new symptoms or medication side effects.        2. Urticaria  Assessment & Plan:  Unable to identify trigger.  Steroids and antihistamines have aided in resolution.  Continue zyrtec 10 mg q day.  Follow up with allergist on March 6.         Return for Annual physical.    MELISSA Green, 2/24/2024, 10:37 AM

## 2024-02-26 ENCOUNTER — OFFICE VISIT (OUTPATIENT)
Dept: FAMILY MEDICINE CLINIC | Facility: CLINIC | Age: 54
End: 2024-02-26

## 2024-02-26 VITALS
HEART RATE: 76 BPM | DIASTOLIC BLOOD PRESSURE: 79 MMHG | SYSTOLIC BLOOD PRESSURE: 130 MMHG | WEIGHT: 209 LBS | BODY MASS INDEX: 33.59 KG/M2 | HEIGHT: 66 IN

## 2024-02-26 DIAGNOSIS — R21 RASH: Primary | ICD-10-CM

## 2024-02-26 DIAGNOSIS — L50.9 URTICARIA: ICD-10-CM

## 2024-02-26 PROCEDURE — 99213 OFFICE O/P EST LOW 20 MIN: CPT

## 2024-02-26 PROCEDURE — 3078F DIAST BP <80 MM HG: CPT

## 2024-02-26 PROCEDURE — 3075F SYST BP GE 130 - 139MM HG: CPT

## 2024-02-26 PROCEDURE — 3008F BODY MASS INDEX DOCD: CPT

## 2024-02-26 NOTE — ASSESSMENT & PLAN NOTE
Unable to identify trigger.  Steroids and antihistamines have aided in resolution.  Continue zyrtec 10 mg q day.  Follow up with allergist on March 6.

## 2024-02-26 NOTE — ASSESSMENT & PLAN NOTE
RX for cetrizine.  Continue to take 10 mg daily.  Finish pred burst.  Follow up as needed.    Patient aware of plan of care. All questions answered to satisfaction of the patient. Patient instructed to call office or reach out via [id number] if any issues arise. For urgent issues and/or reviewed red flags please proceed to the urgent care or ER.  Also, inform the nurse practitioner with any new symptoms or medication side effects.

## 2024-03-04 ENCOUNTER — TELEPHONE (OUTPATIENT)
Dept: FAMILY MEDICINE CLINIC | Facility: CLINIC | Age: 54
End: 2024-03-04

## 2024-03-04 NOTE — TELEPHONE ENCOUNTER
Patient is due for diabetic follow up w/PCP.   Attempted to reach patient to schedule appt. Phone doesn't ring. Invalid number.

## 2024-03-06 ENCOUNTER — OFFICE VISIT (OUTPATIENT)
Dept: ALLERGY | Facility: CLINIC | Age: 54
End: 2024-03-06

## 2024-03-06 ENCOUNTER — LAB ENCOUNTER (OUTPATIENT)
Dept: LAB | Age: 54
End: 2024-03-06
Attending: ALLERGY & IMMUNOLOGY
Payer: COMMERCIAL

## 2024-03-06 ENCOUNTER — NURSE ONLY (OUTPATIENT)
Dept: ALLERGY | Facility: CLINIC | Age: 54
End: 2024-03-06

## 2024-03-06 VITALS — SYSTOLIC BLOOD PRESSURE: 117 MMHG | DIASTOLIC BLOOD PRESSURE: 64 MMHG | HEART RATE: 83 BPM | OXYGEN SATURATION: 98 %

## 2024-03-06 DIAGNOSIS — Z23 NEED FOR COVID-19 VACCINE: ICD-10-CM

## 2024-03-06 DIAGNOSIS — L50.1 CHRONIC IDIOPATHIC URTICARIA: ICD-10-CM

## 2024-03-06 DIAGNOSIS — L50.1 CHRONIC IDIOPATHIC URTICARIA: Primary | ICD-10-CM

## 2024-03-06 DIAGNOSIS — Z23 FLU VACCINE NEED: ICD-10-CM

## 2024-03-06 DIAGNOSIS — Z91.09 ENVIRONMENTAL ALLERGIES: Primary | ICD-10-CM

## 2024-03-06 DIAGNOSIS — Z91.09 ENVIRONMENTAL ALLERGIES: ICD-10-CM

## 2024-03-06 LAB
C4 SERPL-MCNC: 38.6 MG/DL (ref 12–36)
ERYTHROCYTE [SEDIMENTATION RATE] IN BLOOD: 21 MM/HR
TSI SER-ACNC: 1.55 MIU/ML (ref 0.55–4.78)

## 2024-03-06 PROCEDURE — 86038 ANTINUCLEAR ANTIBODIES: CPT

## 2024-03-06 PROCEDURE — 95004 PERQ TESTS W/ALRGNC XTRCS: CPT | Performed by: ALLERGY & IMMUNOLOGY

## 2024-03-06 PROCEDURE — 3078F DIAST BP <80 MM HG: CPT | Performed by: ALLERGY & IMMUNOLOGY

## 2024-03-06 PROCEDURE — 95024 IQ TESTS W/ALLERGENIC XTRCS: CPT | Performed by: ALLERGY & IMMUNOLOGY

## 2024-03-06 PROCEDURE — 86225 DNA ANTIBODY NATIVE: CPT

## 2024-03-06 PROCEDURE — 85652 RBC SED RATE AUTOMATED: CPT

## 2024-03-06 PROCEDURE — 3074F SYST BP LT 130 MM HG: CPT | Performed by: ALLERGY & IMMUNOLOGY

## 2024-03-06 PROCEDURE — 99244 OFF/OP CNSLTJ NEW/EST MOD 40: CPT | Performed by: ALLERGY & IMMUNOLOGY

## 2024-03-06 PROCEDURE — 84443 ASSAY THYROID STIM HORMONE: CPT

## 2024-03-06 PROCEDURE — 36415 COLL VENOUS BLD VENIPUNCTURE: CPT

## 2024-03-06 PROCEDURE — 86160 COMPLEMENT ANTIGEN: CPT

## 2024-03-06 RX ORDER — METHYLPREDNISOLONE 4 MG/1
TABLET ORAL
Qty: 21 TABLET | Refills: 0 | Status: SHIPPED | OUTPATIENT
Start: 2024-03-06

## 2024-03-06 RX ORDER — LEVOCETIRIZINE DIHYDROCHLORIDE 5 MG/1
5 TABLET, FILM COATED ORAL 2 TIMES DAILY
Qty: 180 TABLET | Refills: 1 | Status: SHIPPED | OUTPATIENT
Start: 2024-03-06

## 2024-03-06 NOTE — PROGRESS NOTES
Leonel Boateng is a 53 year old male.    HPI:     Chief Complaint   Patient presents with    Allergies     Symptoms include itchy rash all over the body that occurs randomly. Rash first appeared in October 2023. Patient is unsure of triggers. No antihistamines within the last 5 days.     Patient is a 53-year-old male who presents for allergy consultation upon referral of his PCP with a chief complaint of allergies.    Prior note from visit with PCP from November 2, 2023 with Dr. Trammell  notes a hive-like rash.  Note from visit with BELGICA cuello from 2/2024 reviewed     Subsequent serum IgE testing to foods with PCP was negative with a total IgE level of 17.2.  Patient also with prior allergy evaluation at [facility] with Dr. Jhonny Bush serum IgE testing to nuts at that time was negative.    No COVID vaccines or flu vaccines on record    Today patient reports      Allergies   Duration: Since Oct 2023  Timing: Intermittent, comes and goes  Symptoms: itchy Hive-like rash  Severity: Moderate  Arms legs,   Denies oral swelling or respiratory issues  Status: no current rash today   Triggers:?  Prior serum IgE testing to foods and nuts was negative.  Total IgE level normal.  Tried: zyrtec   Pets or smokers: none   Works  in a warehouse   Used nsaids prn   Last rash 2 weeks prior   Freq: comes and goes   2 flares : Oct 2023, Feb 2023     Hx of asthma, ad, or food allergy:  denies         HISTORY:  Past Medical History:   Diagnosis Date    Diabetes (HCC)     Hyperlipidemia     dx'd in 2003    Type 2 diabetes mellitus (HCC)       History reviewed. No pertinent surgical history.   Family History   Problem Relation Age of Onset    Diabetes Father         type 2    Hypertension Mother       Social History:   Social History     Socioeconomic History    Marital status:    Tobacco Use    Smoking status: Never    Smokeless tobacco: Never   Vaping Use    Vaping Use: Never used   Substance and Sexual Activity     Alcohol use: No     Alcohol/week: 0.0 standard drinks of alcohol     Comment: non-drinker    Drug use: No   Social History Narrative    ** Merged History Encounter **             Medications (Active prior to today's visit):  Current Outpatient Medications   Medication Sig Dispense Refill    levocetirizine 5 MG Oral Tab Take 1 tablet (5 mg total) by mouth in the morning and 1 tablet (5 mg total) before bedtime. 180 tablet 1    clotrimazole-betamethasone 1-0.05 % External Cream Apply 1 inch topically 2 (two) times daily. Apple one inch twice daily no more than two weeks. 1 each 0    metFORMIN 850 MG Oral Tab Take 1 tablet (850 mg total) by mouth 2 (two) times daily with meals. 180 tablet 0    glimepiride 2 MG Oral Tab Take 1 tablet (2 mg total) by mouth daily with breakfast. 90 tablet 0    Empagliflozin-linaGLIPtin (GLYXAMBI) 25-5 MG Oral Tab Take 1 tablet by mouth daily. 90 tablet 0    metFORMIN HCl 1000 MG Oral Tab Take 800 mg by mouth 2 (two) times daily with meals.      semaglutide (OZEMPIC, 0.25 OR 0.5 MG/DOSE,) 2 MG/1.5ML Subcutaneous Solution Pen-injector Inject 0.5 mg into the skin once a week. Initially, 0.25 mg subcutaneously once every 7 days (weekly). Administer the dose at any time of day, with or without meals. After 4 weeks increase the dose to 0.5 mg subcutaneously once weekly. 4 each 3    benzonatate 100 MG Oral Cap Take 1 capsule (100 mg total) by mouth 3 (three) times daily as needed for cough. (Patient not taking: Reported on 2/23/2024) 30 capsule 0    fluticasone propionate 50 MCG/ACT Nasal Suspension 2 sprays by Nasal route daily. (Patient not taking: Reported on 2/26/2024) 16 g 0    losartan 25 MG Oral Tab Take 1 tablet (25 mg total) by mouth daily. (Patient not taking: Reported on 3/6/2024) 10 tablet 0    insulin glargine (SEMGLEE) 100 UNIT/ML Subcutaneous Solution Pen-injector Inject 30 Units into the skin nightly. (Patient not taking: Reported on 2/26/2024) 5 each 3    insulin glargine  (LANTUS SOLOSTAR) 100 UNIT/ML Subcutaneous Solution Pen-injector Inject 30 Units into the skin nightly. Box on 32 BD needles (Patient not taking: Reported on 2/26/2024) 5 each 2    Glucose Blood (CONTOUR NEXT TEST) In Vitro Strip 1 strip by In Vitro route daily. (Patient not taking: Reported on 3/6/2024)      naproxen 500 MG Oral Tab Take 1 tablet (500 mg total) by mouth 2 (two) times daily with meals. (Patient not taking: Reported on 3/6/2024) 30 tablet 0    ALPRAZolam (XANAX) 0.25 MG Oral Tab Take 1 tablet (0.25 mg total) by mouth nightly as needed for Sleep or Anxiety. To take once per day as needed for anxiety. Can cause sedation/ fatigue. (Patient not taking: Reported on 2/26/2024) 30 tablet 0    ergocalciferol 1.25 MG (69828 UT) Oral Cap  (Patient not taking: Reported on 3/6/2024)      cyclobenzaprine 5 MG Oral Tab Take 1 tablet (5 mg total) by mouth nightly as needed for Muscle spasms. (Patient not taking: Reported on 3/6/2024) 20 tablet 0    gabapentin 100 MG Oral Cap Take 1 capsule (100 mg total) by mouth 3 (three) times daily. (Patient not taking: Reported on 2/26/2024) 90 capsule 1    temazepam 30 MG Oral Cap Take 1 capsule (30 mg total) by mouth nightly as needed for Sleep. (Patient not taking: Reported on 2/26/2024) 30 capsule 1    Meclizine HCl 25 MG Oral Tab Take 1 tablet (25 mg total) by mouth 3 (three) times daily as needed. (Patient not taking: Reported on 3/6/2024) 20 tablet 0       Allergies:  No Known Allergies      ROS:     Allergic/Immuno:  See HPI  Cardiovascular:  Negative for irregular heartbeat/palpitations, chest pain, edema  Constitutional:  Negative night sweats,weight loss, irritability and lethargy  Endocrine:  Negative for cold intolerance, polydipsia and polyphagia  ENMT:  Negative for ear drainage, hearing loss and nasal drainage  Eyes:  Negative for eye discharge and vision loss  Gastrointestinal:  Negative for abdominal pain, diarrhea and vomiting  Genitourinary:  Negative for  dysuria and hematuria  Hema/Lymph:  Negative for easy bleeding and easy bruising  Integumentary: see hpi   Musculoskeletal:  Negative for joint symptoms  Neurological:  Negative for dizziness, seizures  Psychiatric:  Negative for inappropriate interaction and psychiatric symptoms  Respiratory:  Negative for cough, dyspnea and wheezing      PHYSICAL EXAM:   Constitutional: responsive, no acute distress noted  Head/Face: NC/Atraumatic  Eyes/Vision: conjunctiva and lids are normal extraocular motion is intact   Ears/Audiometry: tympanic membranes are normal bilaterally hearing is grossly intact  Nose/Mouth/Throat: nose and throat are clear mucous membranes are moist   Neck/Thyroid: neck is supple without adenopathy  Lymphatic: no abnormal cervical, supraclavicular or axillary adenopathy is noted  Respiratory: normal to inspection lungs are clear to auscultation bilaterally normal respiratory effort   Cardiovascular: regular rate and rhythm no murmurs, gallups, or rubs  Abdomen: soft non-tender non-distended  Skin/Hair: no unusual rashes present  Extremities: no edema, cyanosis, or clubbing  Neurological:Oriented to time, place, person & situation       ASSESSMENT/PLAN:   Assessment   Encounter Diagnoses   Name Primary?    Flu vaccine need     Need for COVID-19 vaccine     Chronic idiopathic urticaria Yes    Environmental allergies      Skin testing today to common indoor and outdoor environmental allergies was negative     Positive histamine control      #1 chronic idiopathic urticaria  Started in October 2023  Handouts on chronic hives provided and reviewed including majority being idiopathic in nature  Chronic hives guidelines will check ESR C4 TSH and TYESHA.  Reviewed symptomatic care with antihistamines including Xyzal, levocetirizine 5 mg once a day up to 4 times per day if needed  Consider Xolair if refractory  Medrol Dosepak provided as a steroid pack if Xyzal up to 4 times per day is not controlling her event of  flare        #2 environmental allergies   see above skin testing to screen for environmental triggers  Reviewed avoidance measures  Consider Xyzal 5 mg if having runny nose sneezing itchy watery eyes  May add on Flonase or Nasacort 2 sprays per nostril if having prominent nasal congestion or postnasal drip    #3 flu vaccine recommended and offered    #4 COVID vaccines recommended.  Reviewed I do not have the vaccine in my office.  New booster available since September 2023           Orders This Visit:  Orders Placed This Encounter   Procedures    TSH W Reflex To Free T4    Connective Tissue Disease (TYESHA) Screen    Complement C4, Serum    Sed Rate, Westergren (Automated)       Meds This Visit:  Requested Prescriptions     Signed Prescriptions Disp Refills    levocetirizine 5 MG Oral Tab 180 tablet 1     Sig: Take 1 tablet (5 mg total) by mouth in the morning and 1 tablet (5 mg total) before bedtime.       Imaging & Referrals:  None     3/6/2024  Raul Campbell MD      If medication samples were provided today, they were provided solely for patient education and training related to self administration of these medications.  Teaching, instruction and sample was provided to the patient by myself.  Teaching included  a review of potential adverse side effects as well as potential efficacy.  Patient's questions were answered in regards to medication administration and dosing and potential side effects. Teaching was provided via the teach back method

## 2024-03-06 NOTE — PATIENT INSTRUCTIONS
#1 chronic idiopathic urticaria  Started in October 2023  Handouts on chronic hives provided and reviewed including majority being idiopathic in nature  Chronic hives guidelines will check ESR C4 TSH and TYESHA.  Reviewed symptomatic care with antihistamines including Xyzal, levocetirizine 5 mg once a day up to 4 times per day if needed  Consider Xolair if refractory  Medrol Dosepak provided as a steroid pack if Xyzal up to 4 times per day is not controlling her event of flare      #2 environmental allergies   see above skin testing to screen for environmental triggers  Reviewed avoidance measures  Consider Xyzal 5 mg if having runny nose sneezing itchy watery eyes  May add on Flonase or Nasacort 2 sprays per nostril if having prominent nasal congestion or postnasal drip    #3 flu vaccine recommended and offered    #4 COVID vaccines recommended.  Reviewed I do not have the vaccine in my office.  New booster available since September 2023           Orders This Visit:  Orders Placed This Encounter   Procedures    TSH W Reflex To Free T4    Connective Tissue Disease (TYESHA) Screen    Complement C4, Serum    Sed Rate, Westergren (Automated)       Meds This Visit:  Requested Prescriptions     Signed Prescriptions Disp Refills    levocetirizine 5 MG Oral Tab 180 tablet 1     Sig: Take 1 tablet (5 mg total) by mouth in the morning and 1 tablet (5 mg total) before bedtime.

## 2024-03-07 LAB
DSDNA IGG SERPL IA-ACNC: 2.1 IU/ML
ENA AB SER QL IA: 0.2 UG/L
ENA AB SER QL IA: NEGATIVE

## 2024-03-09 ENCOUNTER — TELEPHONE (OUTPATIENT)
Dept: ALLERGY | Facility: CLINIC | Age: 54
End: 2024-03-09

## 2024-03-09 NOTE — TELEPHONE ENCOUNTER
----- Message from Raul Campbell MD sent at 3/7/2024  7:43 AM CST -----  Please contact patient with mildly elevated ESR 21.  1 point above normal  C4 mildly elevated 38.6.  This should not be cause for concern.  I be more concerned C4 level is low TSH was normal.  TYESHA still pending

## 2024-03-09 NOTE — TELEPHONE ENCOUNTER
----- Message from Raul Campbell MD sent at 3/7/2024  2:34 PM CST -----    Please contact patient with negative TYESHA

## 2024-03-11 NOTE — TELEPHONE ENCOUNTER
[employer]  used for call.   Juan  ID #[id number]     left message for patient to call back.

## 2024-03-25 NOTE — TELEPHONE ENCOUNTER
RN left voicemail for patient to please call office back to go over test results  Second attempt to contact patient

## 2024-04-03 NOTE — TELEPHONE ENCOUNTER
Third attempt to contact patient in regards to test results with no response  Letter sent home  No further action required at this time

## 2024-04-09 ENCOUNTER — TELEPHONE (OUTPATIENT)
Dept: FAMILY MEDICINE CLINIC | Facility: CLINIC | Age: 54
End: 2024-04-09

## 2024-06-11 NOTE — PROGRESS NOTES
9/18/2018  4:52 PM    Tone Martinez is a 50year old male. Chief complaint(s): Patient presents with:  Headache: X 1 day  Abdominal Pain: X 1 day    HPI:     Tone Martinez primary complaint is regarding DM.      Patient Peter Cacrees is a 50 non-drinker    Drug use: No       Immunizations:     Immunization History  Administered            Date(s) Administered    >=3 YRS TRI  MULTIDOSE VIAL (46367) FLU CLINIC                          10/11/2016      TDAP                  08/01/2013      Medicat appears well-developed and well-nourished. /81 (BP Location: Right arm, Patient Position: Sitting, Cuff Size: adult)   Pulse 76   Wt 225 lb (102.1 kg)   BMI 35.24 kg/m²    HENT:   Head: Normocephalic.    Right Ear: External ear normal.   Left Ear: E Hold Lavender Auto Resulted    CBC W/ DIFFERENTIAL   Result Value Ref Range    WBC 7.7 4.0 - 11.0 K/UL    RBC 5.38 4.50 - 5.90 M/UL    HGB 15.0 13.5 - 17.5 g/dL    HCT 44.7 41.0 - 52.0 %    MCV 83.1 80.0 - 100.0 fL    MCH 27.9 27.0 - 32.0 pg    MCHC 33.5 3 (VIAGRA) 100 MG Oral Tab, Take 1 tablet (100 mg total) by mouth as needed for Erectile Dysfunction. , Disp: 3 tablet, Rfl: 1  •  CARLY CONTOUR NEXT TEST In Vitro Strip, , Disp: , Rfl: 0  •  Blood Glucose Monitoring Suppl Does not apply Kit, Carly Contour Ne ** Microalbumin [id number]      **CMP  Comp Metabolic Panel (14) [E]      Meds This Visit:  Requested Prescriptions     Signed Prescriptions Disp Refills   • SAXagliptin HCl (ONGLYZA) 5 MG Oral Tab 90 tablet 0     Sig: Take 1 tablet by mouth daily.    • MetF Awake/Alert/Cooperative

## 2025-01-27 ENCOUNTER — HOSPITAL ENCOUNTER (OUTPATIENT)
Age: 55
Discharge: HOME | End: 2025-01-27
Payer: MEDICAID

## 2025-01-27 VITALS
RESPIRATION RATE: 18 BRPM | TEMPERATURE: 98 F | DIASTOLIC BLOOD PRESSURE: 69 MMHG | HEART RATE: 79 BPM | OXYGEN SATURATION: 99 % | SYSTOLIC BLOOD PRESSURE: 123 MMHG

## 2025-01-27 DIAGNOSIS — K04.7 DENTAL INFECTION: Primary | ICD-10-CM

## 2025-01-27 PROCEDURE — 99213 OFFICE O/P EST LOW 20 MIN: CPT | Performed by: NURSE PRACTITIONER

## 2025-01-27 NOTE — ED INITIAL ASSESSMENT (HPI)
Toothache started yesterday that not getting better. Worse at night. Denies any injury no trauma.

## 2025-01-28 NOTE — DISCHARGE INSTRUCTIONS
Please start the antibitoics take as prescribed.  Close follow-up with your dentist as recommended.  Please call tomorrow morning to schedule a follow-up appointment.  You may also take Tylenol or ibuprofen as needed for pain.

## 2025-01-28 NOTE — ED PROVIDER NOTES
Patient Seen in: [facility]      History     Chief Complaint   Patient presents with    Dental Problem     Stated Complaint: toothache  Subjective:   HPI    This is a 54-year-old male with a history of diabetes and hyperlipidemia who presents with left upper and lower dental pain which started yesterday.  He does have a dentist, he plans on calling tomorrow to schedule a follow-up appointment.  Denies any fever or chills.    Objective:   Past Medical History:    Diabetes (HCC)    Hyperlipidemia    dx'd in 2003    Type 2 diabetes mellitus (HCC)            History reviewed. No pertinent surgical history.           Social History     Socioeconomic History    Marital status:    Tobacco Use    Smoking status: Never    Smokeless tobacco: Never   Vaping Use    Vaping status: Never Used   Substance and Sexual Activity    Alcohol use: No     Alcohol/week: 0.0 standard drinks of alcohol     Comment: non-drinker    Drug use: No   Social History Narrative    ** Merged History Encounter **          Social Drivers of Health     Food Insecurity: No Food Insecurity (3/9/2021)    [facility], [facility]    Food Insecurity     Within the past 30 days, I worried whether my food would run out before I got money to buy more. / En los últimos 30 días, me preocupó que la comida se podía acabar antes de tener dinero para compr...: Never true / Nunca     Within the past 30 days, the food that I bought just didn't last, and I didn't have money to get more. / En los últimos 30 días, La comida que compré no rindió lo suficiente, y no tenía dinero para...: Never true / Nunca    [facility]    Housing Stability            Review of Systems   All other systems reviewed and are negative.      Positive for stated complaint: Dental Problem    Other systems are as noted in HPI.  Constitutional and vital signs  reviewed.      All other systems reviewed and negative except as noted above.    Physical Exam     ED Triage Vitals [01/27/25 1756]   /69   Pulse 79   Resp 18   Temp 98 °F (36.7 °C)   Temp src Oral   SpO2 99 %   O2 Device None (Room air)     Current:/69   Pulse 79   Temp 98 °F (36.7 °C) (Oral)   Resp 18   SpO2 99%     Physical Exam  Vitals and nursing note reviewed.   Constitutional:       General: He is awake. He is not in acute distress.     Appearance: Normal appearance. He is not ill-appearing, toxic-appearing or diaphoretic.   HENT:      Head: Normocephalic and atraumatic.      Right Ear: Tympanic membrane, ear canal and external ear normal.      Left Ear: Tympanic membrane, ear canal and external ear normal.      Nose: Nose normal.      Mouth/Throat:      Lips: Pink.      Mouth: Mucous membranes are moist.      Dentition: Abnormal dentition. Does not have dentures. Dental tenderness and dental caries present. No dental abscesses.      Pharynx: Oropharynx is clear. Uvula midline.        Comments: Tenderness to the left upper and lower dentition, he has poor dentition with multiple caries.  No fluctuance or induration.  No evidence of abscess.  No bony jaw tenderness.  No trismus.  Eyes:      General: Lids are normal.      Extraocular Movements: Extraocular movements intact.      Conjunctiva/sclera: Conjunctivae normal.      Pupils: Pupils are equal, round, and reactive to light.   Cardiovascular:      Rate and Rhythm: Normal rate and regular rhythm.      Pulses: Normal pulses.      Heart sounds: Normal heart sounds.   Pulmonary:      Effort: Pulmonary effort is normal.      Breath sounds: Normal breath sounds.   Skin:     General: Skin is warm and dry.      Capillary Refill: Capillary refill takes less than 2 seconds.   Neurological:      General: No focal deficit present.      Mental Status: He is alert and oriented to person, place, and time.   Psychiatric:         Mood and Affect: Mood  normal.         Behavior: Behavior normal. Behavior is cooperative.         Thought Content: Thought content normal.         Judgment: Judgment normal.         ED Course     No results found.  Labs Reviewed - No data to display    MDM     Medical Decision Making  Differential diagnoses reflecting the complexity of care include but are not limited to dental abscess, dental infection.    Comorbidities that add complexity to management include: Diabetes   History obtained by an independent source was from: N/A  Discussions of management was done with: N/A  My independent interpretations of studies include: N/A  Shared decision making was done by: Patient and Myself   Patient is well appearing, non-toxic and in no acute distress.  Vital signs are stable.  Discussed with the patient the need for close follow-up with the dentist.  No evidence of abscess.  I have sent Augmentin to the pharmacy.  I discussed with him he should call tomorrow to schedule a follow-up appointment with dentist.  Tylenol or ibuprofen for pain.  Patient verbalized plan of care and states understanding.    Problems Addressed:  Dental infection: acute illness or injury    Risk  OTC drugs.  Prescription drug management.        Disposition and Plan     Clinical Impression:  1. Dental infection         Disposition:  Discharge  1/27/2025  6:15 pm    Follow-up:  Donavan Kumar MD  [address] [place] [address]  [phone]                Medications Prescribed:  Discharge Medication List as of 1/27/2025  6:18 PM        START taking these medications    Details   amoxicillin clavulanate 875-125 MG Oral Tab Take 1 tablet by mouth 2 (two) times daily for 10 days., Normal, Disp-20 tablet, R-0                Note to patient: The 21st Century cares act makes medical notes like these available to patients in the interest of transparency.  However, be advised this medical document and is intended as peer to peer communication.  It is read the  medical language and may contain abbreviations or verbiage that are unfamiliar.  It may appear blunt or direct.  Medical documents are intended to carry relevant information, fax is evident and the clinical opinion of the practitioner.    This note was prepared using Dragon Medical voice recognition dictation software.  As a result, errors may occur.  When identified, these errors have been corrected.  While every attempt is made to correct errors during dictation, discrepancies may still exist.    Lula Carter, APRN  1/27/2025  6:14 PM

## (undated) NOTE — LETTER
05/09/19        Leonel Stacy  [address] [facility]      Dear Donn Cannon,    Our records indicate that you have outstanding lab work and or testing that was ordered for you and has not yet been completed:  Orders Placed This Encounter

## (undated) NOTE — LETTER
10/20/2017          To Whom It May Concern:    Jayme Jones is currently under my medical care and may not return to work at this time. He may return to work on 10/23/17. Activity is restricted as follows: none.     If you require additional i

## (undated) NOTE — LETTER
9/25/2017          To Whom It May Concern:    Ady Cedeno is currently under my medical care and may not return to work at this time. Please excuse Brian Sox for 3 days. He may return to work on 09/28/17. Activity is restricted as follows: none.

## (undated) NOTE — LETTER
10/4/2017          To Whom It May Concern:    Antonella Tejada is currently under my medical care and may not return to work at this time. Please excuse Estiven Perdomo for 1 week. He may return to work on 10/09/17. Activity is restricted as follows: none.

## (undated) NOTE — LETTER
April 3, 2024      Leonel Boateng  [address]  [facility] [address]      Dear Leonel:    Our office has been trying to contact you to discuss your recent test results.  Please contact our office at your earliest convenience at [phone].    Did you know that you can receive test result information and reminders securely through [email or web address], which is available online or through the [employer] mobile dileep.  To register for an account, you can go to [email or web address]  and click \"Sign Up Now\" and use the \"Sign up Online\" link on the right. Follow the on-screen instructions to complete your registration. If at any time you are having difficulties, please contact our [email or web address] Customer Service Support Monday through Friday from 7 a.m. to 7 p.m. or on Saturdays and Sundays from 7 a.m. to 3 p.m. at [phone], option 3.    Look for the [employer] branded [email or web address] Dileep in Apple Dileep Store or from Google Play:      As always, thank you for selecting [facility] for your healthcare needs.    Sincerely,    Your Physician & Nursing Staff

## (undated) NOTE — LETTER
October 13, 2017     [address]  [place]      Dear Brian Marie:    Below are the results from your recent visit:  Labs are within normal limits.    Resulted Orders   URINALYSIS NONAUTO W/O SCOPE   Result Value Ref Range    G

## (undated) NOTE — ED AVS SNAPSHOT
Maru Perla   MRN: [id number]    Department:  [facility] Emergency Department   Date of Visit:  10/30/2017           Disclosure     Insurance plans vary and the physician(s) referred by the ER may not be covered by your plan.  Please co CARE PHYSICIAN AT ONCE OR RETURN IMMEDIATELY TO THE EMERGENCY DEPARTMENT. If you have been prescribed any medication(s), please fill your prescription right away and begin taking the medication(s) as directed.   If you believe that any of the medications

## (undated) NOTE — LETTER
10/27/2017          To Whom It May Concern:    Mary Sher is currently under my medical care and may not return to work at this time. He may return to work on 10/31/2017. Activity is restricted as follows: none.     If you require additional i

## (undated) NOTE — LETTER
[facility] CARE [address] [address]  [phone]            Patient: Leonel Boateng   YOB: 1970   Date of Visit: 1/5/2024       Estimado empleado,       January 5, 2024    En [facility] estamos tomando precauciones especiales y haciendo todo lo que podemos para evitar la propagación del COVID-19. Ivan philip tiempo, les pedimos blood apoyo en relación con la documentación del médico para empleados que regresan a trabajar después de roosevelt enfermedad respiratoria.     Los Centros de Control de Enfermedades (CDC) recomiendan lo siguiente:    Asegurar que las políticas de ausencias por enfermedad deejay flexibles y consistentes con las directrices de pippa pública, y que los empleados conozcan y comprendan estas políticas.   Mantener políticas flexibles que permitan que los empleados se queden en casa para atender a un familiar enfermo o para cuidar a los niños debido al cierre de escuelas y guarderías.   Los empleadores no deben solicitar un resultado de la prueba de COVID-19 o la nota de un proveedor de atención médica para que los empleados validen blood enfermedad, califiquen para roosevelt ausencia por enfermedad o para regresar a trabajar.   Tener en cuenta que las oficinas del proveedor de atención médica y los centros médicos pueden estar annyadamente ocupados y no pueden proporcionar documentación de manera oportuna. La mayor parte de las personas con COVID-19 tienen roosevelt enfermedad leve, se pueden recuperar en casa sin atención médica y pueden seguir las recomendaciones del CDC para determinar cuándo descontinuar el aislamiento en casa y regresar a trabajar.      Las personas con síntomas de COVID-19 con instrucciones de atenderse en casa deberán hacer lo siguiente:  Aislarse ivan daquan días. Si la persona no tiene síntomas o los síntomas se están reduciendo (sin fiebre ivan 24 horas), podrá descontinuar el aislamiento el sexto día. Se considerará el día cero  el día que iniciaron los síntomas.   Luego del aislamiento, usar el cubrebocas ivan daquan días más cuando estén cerca de otras personas para minimizar el riesgo de infección.     Las personas infectadas con SARS-CoV-2 que nunca desarrollaron síntomas de COVID-19:    Se considerará el día cero el día que la prueba mellissa positiva.   Aislarse ivan daquan días.  Pueden descontinuar el aislamiento el sexto día.   Luego del aislamiento, usar el cubrebocas ivan daquan días más cuando estén cerca de otras personas para minimizar el riesgo de infección    Personas que son asintomáticas, genaro estuvieron expuestas:  Para las personas que no están vacunadas o que villalta pasado más de seis meses después de blood segunda dosis de mRNA (o más de dos meses después de la vacuna [employer]) y aún no villalta recibido el refuerzo, el CDC recomienda realizar cuarentena ivan daquan días seguidos de daquan días adicionales con el uso estricto del cubrebocas. Alternativamente, si no es posible el aislamiento de daquan días, es imperativo que roosevelt persona expuesta use un cubrebocas mar ajustado en todo momento cuando esté cerca de otras personas ivan 10 días después de la exposición.   Las personas que recibieron blood refuerzo no necesitan hacer cuarentena después de roosevelt exposición, genaro deben usar cubrebocas ivan 10 días después de nely estado expuestas.    La mejor práctica también sería incluir roosevelt prueba el día daquan después de la exposición.   Si llegan a tener síntomas, las personas deben hacer cuarentena de inmediato hasta que roosevelt prueba negativa confirme que los síntomas no son debidos a COVID-19.     Visite el sitio web del CDC para obtener más información y detalles para ayudarlo ivan estos tiempos desafiantes.      Atentamente,     FÁTIMA AranaN

## (undated) NOTE — LETTER
10/26/2019              [address] [facility]        [address] [facility] [address]         Dear Daniel Precise,    This letter is to inform you that our office has made several attempts to reach you by phone without success. We were attempting to contact you by phone regarding - need appointment for medication refills. Please contact our office at the number listed below as soon as you receive this letter to discuss this issue and to make the necessary changes in our system to your contact information. Thank you for your cooperation.         Sincerely,    Shruthi Dotson MD  [address] [address] [address] [facility]

## (undated) NOTE — LETTER
Date & Time: 12/11/2023, 11:12 AM  Patient: Anselmo Johansen  Encounter Provider(s):    MELISSA Grimaldo       To Whom It May Concern:    Anselmo Johansen was seen and treated in our department on 12/11/2023. We discussed instructions on using an Epipen.   If you have any questions or concerns, please do not hesitate to call.        _____________________________  Physician/APC Signature

## (undated) NOTE — MR AVS SNAPSHOT
[address], [address]  [address] [facility]  [phone]               Thank you for choosing us for your health care visit with Aletha Ortiz MD.  We are glad to serve you and happy to provide you with this summ physician's office. At that time, you will be provided with any authorization numbers or be assured that none are required. You can then schedule your appointment.  Failure to obtain required authorization numbers can create reimbursement difficulties for y Support Staff. Remember, [employer] is NOT to be used for urgent needs. For medical emergencies, dial 911. Visit [email or web address] to learn more.         Educational Information     Healthy Diet and Regular Exercise  [facility] [employer]

## (undated) NOTE — LETTER
07/12/21    Leonel Stacy  [address] [facility]      Dear Yanet Ness,    Our records indicate that you have outstanding lab work and or testing that was ordered for you and has not yet been completed:  Orders Placed This Encounter      PSA

## (undated) NOTE — LETTER
07/12/21    Leonel Stacy  [address] [facility]      Dear Yanet Ness,    Our records indicate that you have outstanding lab work and or testing that was ordered for you and has not yet been completed:  Orders Placed This Encounter      PSA

## (undated) NOTE — LETTER
11/27/2020              [address] [facility]        [address] [facility] [address]         Dear Gretchen Coello,    This letter is to inform you that our office has made several attempts to reach you by phone without success.   We were attempting to contact

## (undated) NOTE — LETTER
4/20/2021              [address] [facility]        [address] [facility]         Dear Michael Rodriges,    Our records indicate that the tests ordered for you by Porsha Chavarria MD  have not been done.   If you have, in fact, already completed

## (undated) NOTE — LETTER
9/22/2017          To Whom It May Concern:    Jolie Vidal is currently under my medical care and may not return to work at this time. Please excuse May Hunger for 1 days. He may return to work on 9/25/17. Activity is restricted as follows: none.

## (undated) NOTE — LETTER
1/6/2021              Leonel Stacy        [address] [facility] [address]         To Whom it may concern:     This is to certify that Ines Godoy had an appointment on 1/6/2021 at 11:08 AM with Jonelle Sanon MD.        Sincerely